# Patient Record
Sex: FEMALE | Race: WHITE | NOT HISPANIC OR LATINO | Employment: OTHER | URBAN - METROPOLITAN AREA
[De-identification: names, ages, dates, MRNs, and addresses within clinical notes are randomized per-mention and may not be internally consistent; named-entity substitution may affect disease eponyms.]

---

## 2017-02-01 ENCOUNTER — CLINICAL SUPPORT (OUTPATIENT)
Dept: FAMILY MEDICINE | Facility: CLINIC | Age: 77
End: 2017-02-01
Payer: MEDICARE

## 2017-02-01 DIAGNOSIS — Z23 IMMUNIZATION DUE: Primary | ICD-10-CM

## 2017-02-01 NOTE — PROGRESS NOTES
Pt in clinic today to receive flu  Immunization. Administered utilizing 2 pt identifiers,  Pt tolerated procedure well. Verified by Rossi Mora LPN, exp 5/3/ 17. CLC

## 2017-02-03 ENCOUNTER — OFFICE VISIT (OUTPATIENT)
Dept: FAMILY MEDICINE | Facility: CLINIC | Age: 77
End: 2017-02-03
Payer: MEDICARE

## 2017-02-03 VITALS
DIASTOLIC BLOOD PRESSURE: 78 MMHG | BODY MASS INDEX: 23.19 KG/M2 | WEIGHT: 122.81 LBS | HEIGHT: 61 IN | OXYGEN SATURATION: 99 % | RESPIRATION RATE: 18 BRPM | SYSTOLIC BLOOD PRESSURE: 116 MMHG | HEART RATE: 83 BPM

## 2017-02-03 DIAGNOSIS — M81.0 OSTEOPOROSIS: ICD-10-CM

## 2017-02-03 DIAGNOSIS — E78.5 HYPERLIPIDEMIA, UNSPECIFIED HYPERLIPIDEMIA TYPE: Primary | ICD-10-CM

## 2017-02-03 DIAGNOSIS — K21.9 GASTROESOPHAGEAL REFLUX DISEASE WITHOUT ESOPHAGITIS: ICD-10-CM

## 2017-02-03 PROCEDURE — 99213 OFFICE O/P EST LOW 20 MIN: CPT | Mod: PBBFAC,PO | Performed by: FAMILY MEDICINE

## 2017-02-03 PROCEDURE — 99214 OFFICE O/P EST MOD 30 MIN: CPT | Mod: S$PBB,,, | Performed by: FAMILY MEDICINE

## 2017-02-03 PROCEDURE — 99999 PR PBB SHADOW E&M-EST. PATIENT-LVL III: CPT | Mod: PBBFAC,,, | Performed by: FAMILY MEDICINE

## 2017-02-03 NOTE — PROGRESS NOTES
Subjective:       Patient ID: Sascha Martinez is a 77 y.o. female.    Chief Complaint: Follow-up (f/u lab results)    HPI Comments: Here for f/u chronic medical issues.  Has not followed up with neurology.  States med side effects so not on anything currently.  Due for labs.    Hyperlipidemia   This is a chronic problem. The current episode started more than 1 year ago. The problem is uncontrolled. Pertinent negatives include no chest pain or shortness of breath.     Review of Systems   Constitutional: Negative for chills, fatigue and fever.   Respiratory: Negative for cough, chest tightness and shortness of breath.    Cardiovascular: Negative for chest pain, palpitations and leg swelling.   Gastrointestinal: Negative for abdominal distention and abdominal pain.   Endocrine: Negative for cold intolerance and heat intolerance.   Skin: Negative for rash and wound.   Psychiatric/Behavioral: Negative for dysphoric mood. The patient is not nervous/anxious.        Objective:      Physical Exam   Constitutional: She is oriented to person, place, and time. She appears well-developed and well-nourished.   HENT:   Head: Normocephalic and atraumatic.   Cardiovascular: Normal rate, regular rhythm and normal heart sounds.    Pulmonary/Chest: Effort normal and breath sounds normal.   Abdominal: Soft. Bowel sounds are normal.   Neurological: She is alert and oriented to person, place, and time.   Psychiatric: She has a normal mood and affect.   Nursing note and vitals reviewed.      Assessment:       1. Hyperlipidemia, unspecified hyperlipidemia type    2. Gastroesophageal reflux disease without esophagitis    3. Osteoporosis        Plan:       Hyperlipidemia, unspecified hyperlipidemia type  -     CBC auto differential; Future; Expected date: 2/3/17  -     Comprehensive metabolic panel; Future; Expected date: 2/3/17  -     Lipid panel; Future; Expected date: 2/3/17  -     TSH; Future; Expected date: 2/3/17    Gastroesophageal  reflux disease without esophagitis  -     CBC auto differential; Future; Expected date: 2/3/17  -     Comprehensive metabolic panel; Future; Expected date: 2/3/17  -     Lipid panel; Future; Expected date: 2/3/17  -     TSH; Future; Expected date: 2/3/17    Osteoporosis  -     CBC auto differential; Future; Expected date: 2/3/17  -     Comprehensive metabolic panel; Future; Expected date: 2/3/17  -     Lipid panel; Future; Expected date: 2/3/17  -     TSH; Future; Expected date: 2/3/17    Update labs.  Start statin if elevated again.  F/u with neurology.  Return in about 6 months (around 8/3/2017), or if symptoms worsen or fail to improve.

## 2017-03-29 ENCOUNTER — OFFICE VISIT (OUTPATIENT)
Dept: FAMILY MEDICINE | Facility: CLINIC | Age: 77
End: 2017-03-29
Payer: MEDICARE

## 2017-03-29 VITALS
HEIGHT: 61 IN | TEMPERATURE: 98 F | OXYGEN SATURATION: 96 % | SYSTOLIC BLOOD PRESSURE: 130 MMHG | HEART RATE: 66 BPM | BODY MASS INDEX: 22.86 KG/M2 | WEIGHT: 121.06 LBS | RESPIRATION RATE: 16 BRPM | DIASTOLIC BLOOD PRESSURE: 80 MMHG

## 2017-03-29 DIAGNOSIS — R05.9 COUGH: Primary | ICD-10-CM

## 2017-03-29 DIAGNOSIS — J34.3 NASAL TURBINATE HYPERTROPHY: ICD-10-CM

## 2017-03-29 DIAGNOSIS — R09.81 NASAL CONGESTION: ICD-10-CM

## 2017-03-29 DIAGNOSIS — J34.89 TENDERNESS OVER MAXILLARY SINUS: ICD-10-CM

## 2017-03-29 DIAGNOSIS — J34.89 RHINORRHEA: ICD-10-CM

## 2017-03-29 PROCEDURE — 96372 THER/PROPH/DIAG INJ SC/IM: CPT | Mod: PBBFAC,PO

## 2017-03-29 PROCEDURE — 99213 OFFICE O/P EST LOW 20 MIN: CPT | Mod: PBBFAC,PO | Performed by: NURSE PRACTITIONER

## 2017-03-29 PROCEDURE — 99999 PR PBB SHADOW E&M-EST. PATIENT-LVL III: CPT | Mod: PBBFAC,,, | Performed by: NURSE PRACTITIONER

## 2017-03-29 PROCEDURE — 99214 OFFICE O/P EST MOD 30 MIN: CPT | Mod: S$PBB,,, | Performed by: NURSE PRACTITIONER

## 2017-03-29 RX ORDER — BETAMETHASONE SODIUM PHOSPHATE AND BETAMETHASONE ACETATE 3; 3 MG/ML; MG/ML
6 INJECTION, SUSPENSION INTRA-ARTICULAR; INTRALESIONAL; INTRAMUSCULAR; SOFT TISSUE
Status: COMPLETED | OUTPATIENT
Start: 2017-03-29 | End: 2017-03-29

## 2017-03-29 RX ORDER — LEVOCETIRIZINE DIHYDROCHLORIDE 5 MG/1
5 TABLET, FILM COATED ORAL NIGHTLY
Qty: 30 TABLET | Refills: 3 | Status: SHIPPED | OUTPATIENT
Start: 2017-03-29 | End: 2018-02-26

## 2017-03-29 RX ADMIN — BETAMETHASONE SODIUM PHOSPHATE AND BETAMETHASONE ACETATE 6 MG: 3; 3 INJECTION, SUSPENSION INTRA-ARTICULAR; INTRALESIONAL; INTRAMUSCULAR at 02:03

## 2017-03-29 NOTE — PATIENT INSTRUCTIONS
Saline nasal spray in shower.  Can use tessalon she has at home for cough    Allergic Rhinitis  Allergic rhinitis is an allergic reaction that affects the nose, and often the eyes. Its often known as nasal allergies. Nasal allergies are often due to things in the environment that are breathed in. Depending what you are sensitive to, nasal allergies may occur only during certain seasons. Or they may occur year round. Common indoor allergens include house dust mites, mold, cockroaches, and pet dander. Outdoor allergens include pollen from trees, grasses, and weeds.   Symptoms include a drippy, stuffy, and itchy nose. They also include sneezing and red and itchy eyes. You may feel tired more often. Severe allergies may also affect your breathing and trigger a condition called asthma.   Tests can be done to see what allergens are affecting you. You may be referred to an allergy specialist for testing and further evaluation.  Home care  The healthcare provider may prescribe medicines to help relieve allergy symptoms.   Ask the provider for advice on how to avoid substances that you are allergic to. Below are a few tips for each type of allergen.  Pet dander:  · Do not have pets with fur and feathers.  · If you cannot avoid having a pet, keep it out of your bedroom and off upholstered furniture.  Pollen:  · When pollen counts are high, keep windows of your car and home closed. If possible, use an air conditioner instead.  · Wear a filter mask when mowing or doing yard work.  House dust mites:  · Wash bedding every week in warm water and detergent and dry on a hot setting.  · Cover the mattress, box spring, and pillows with allergy covers.   · If possible, sleep in a room with no carpet, curtains, or upholstered furniture.  Cockroaches:  · Store food in sealed containers.  · Remove garbage from the home promptly.  · Fix water leaks  Mold:  · Keep humidity low by using a dehumidifier or air conditioner. Keep the  dehumidifier and air conditioner clean and free of mold.  · Clean moldy areas with bleach and water.  In general:  · Vacuum once or twice a week. If possible, use a vacuum with a high-efficiency particulate air (HEPA) filter.  · Do not smoke. Avoid cigarette smoke. Cigarette smoke is an irritant that can make symptoms worse.  Follow-up care  Follow up as advised by the health care provider or our staff. If you were referred to an allergy specialist, make this appointment promptly.  When to seek medical advice  Call your healthcare provider right away if the following occur:  · Coughing or wheezing  · Fever greater than 100.4°F (38°C)  · Continuing symptoms, new symptoms, or worsening symptoms  Call 911 right away if you have:  · Trouble breathing  · Hives (raised red bumps)  · Severe swelling of the face or severe itching of the eyes or mouth  Date Last Reviewed: 4/26/2015  © 1349-7047 [employer]. [address] [place] [address], [place] [address]. All rights reserved. This information is not intended as a substitute for professional medical care. Always follow your healthcare professional's instructions.

## 2017-03-29 NOTE — PROGRESS NOTES
"Subjective:       Patient ID: Sascha Martinez is a 77 y.o. female.    Chief Complaint: Cough  She was last seen in primary care by Dr. Dewitt on 02/03/2017. This is her first time seeing me in the clinic.  Cough   This is a new problem. The current episode started 1 to 4 weeks ago (states about one month ago). The problem has been gradually worsening. The cough is productive of sputum (white phlegm). Associated symptoms include postnasal drip, rhinorrhea and a sore throat. Pertinent negatives include no ear pain, fever or nasal congestion. Nothing aggravates the symptoms. She has tried nothing for the symptoms. Her past medical history is significant for environmental allergies. There is no history of asthma, COPD or pneumonia. environmental allergies "years back"     Vitals:    03/29/17 1044   BP: 130/80   Pulse: 66   Resp: 16   Temp: 97.9 °F (36.6 °C)     Review of Systems   Constitutional: Negative for fever.   HENT: Positive for postnasal drip, rhinorrhea and sore throat. Negative for ear pain.    Respiratory: Positive for cough.    Allergic/Immunologic: Positive for environmental allergies.       Objective:      Physical Exam   Constitutional: She is oriented to person, place, and time. Vital signs are normal. She appears well-developed and well-nourished.   HENT:   Head: Normocephalic and atraumatic.   Right Ear: Hearing, tympanic membrane, external ear and ear canal normal.   Left Ear: Tympanic membrane, external ear and ear canal normal.   Nose: Rhinorrhea present. Right sinus exhibits maxillary sinus tenderness. Left sinus exhibits maxillary sinus tenderness.   Mouth/Throat: Oropharynx is clear and moist.   Eyes: Lids are normal.   Neck: Normal range of motion. Neck supple.   Cardiovascular: Normal rate.  An irregular rhythm present.   Pulmonary/Chest: Effort normal and breath sounds normal.   Abdominal: Soft. Bowel sounds are normal. There is no tenderness.   Lymphadenopathy:        Head (right side): No " "submental, no submandibular, no tonsillar, no preauricular, no posterior auricular and no occipital adenopathy present.        Head (left side): No submental, no submandibular, no tonsillar, no preauricular, no posterior auricular and no occipital adenopathy present.     She has no cervical adenopathy.   Neurological: She is alert and oriented to person, place, and time.   Skin: Skin is warm, dry and intact.   Psychiatric: She has a normal mood and affect. Her speech is normal and behavior is normal. Judgment and thought content normal. Cognition and memory are normal.   Nursing note and vitals reviewed.      Assessment & Plan:       Cough    Rhinorrhea  -     betamethasone acetate-betamethasone sodium phosphate injection 6 mg; Inject 1 mL (6 mg total) into the muscle one time.  -     levocetirizine (XYZAL) 5 MG tablet; Take 1 tablet (5 mg total) by mouth every evening.  Dispense: 30 tablet; Refill: 3    Nasal congestion  -     betamethasone acetate-betamethasone sodium phosphate injection 6 mg; Inject 1 mL (6 mg total) into the muscle one time.    Nasal turbinate hypertrophy  -     betamethasone acetate-betamethasone sodium phosphate injection 6 mg; Inject 1 mL (6 mg total) into the muscle one time.    Tenderness over maxillary sinus    Saline nasal spray in shower.  Can use tessalon she has at home for cough    Discussed with her about side effects of steroid injections if used chronically like skin dimpling at injection site or decreased bone mineral density. Her last injection according to our records was in 2012. I have discussed that her symptoms would clear without steroids but she is very vocal that she is greatly improved with "the shot".      No Follow-up on file.  "

## 2017-06-09 ENCOUNTER — TELEPHONE (OUTPATIENT)
Dept: FAMILY MEDICINE | Facility: CLINIC | Age: 77
End: 2017-06-09

## 2017-06-09 NOTE — TELEPHONE ENCOUNTER
Spoke to pt  and he states pt right upper arm has been hurting for about a month now and has gotten worse. Pt scheduled appt for Monday and would like to know if you can put order in for xray before visit. Please advise.

## 2017-06-09 NOTE — TELEPHONE ENCOUNTER
I would like to see patient first to determine if I need arm, shoulder, or other xray. Please advise will order when she come in for appt. Thanks

## 2017-06-09 NOTE — TELEPHONE ENCOUNTER
----- Message from Salud Weller sent at 6/9/2017  8:48 AM CDT -----  Contact:    sameera   Pain in upper R arm   Slow onset, 1 month  Call back     No injury

## 2017-06-12 ENCOUNTER — OFFICE VISIT (OUTPATIENT)
Dept: FAMILY MEDICINE | Facility: CLINIC | Age: 77
End: 2017-06-12
Payer: MEDICARE

## 2017-06-12 ENCOUNTER — HOSPITAL ENCOUNTER (OUTPATIENT)
Dept: RADIOLOGY | Facility: HOSPITAL | Age: 77
Discharge: HOME | End: 2017-06-12
Attending: NURSE PRACTITIONER
Payer: MEDICARE

## 2017-06-12 VITALS
HEART RATE: 80 BPM | SYSTOLIC BLOOD PRESSURE: 134 MMHG | BODY MASS INDEX: 22.64 KG/M2 | WEIGHT: 119.94 LBS | TEMPERATURE: 98 F | HEIGHT: 61 IN | DIASTOLIC BLOOD PRESSURE: 68 MMHG

## 2017-06-12 DIAGNOSIS — M81.0 SENILE OSTEOPOROSIS: ICD-10-CM

## 2017-06-12 DIAGNOSIS — M79.621 PAIN OF RIGHT UPPER ARM: Primary | ICD-10-CM

## 2017-06-12 DIAGNOSIS — M79.621 PAIN OF RIGHT UPPER ARM: ICD-10-CM

## 2017-06-12 DIAGNOSIS — R29.898 DECREASED GRIP STRENGTH OF RIGHT HAND: ICD-10-CM

## 2017-06-12 DIAGNOSIS — M81.8 OTHER OSTEOPOROSIS WITHOUT CURRENT PATHOLOGICAL FRACTURE: ICD-10-CM

## 2017-06-12 PROCEDURE — 73060 X-RAY EXAM OF HUMERUS: CPT | Mod: 26,RT,, | Performed by: RADIOLOGY

## 2017-06-12 PROCEDURE — 73060 X-RAY EXAM OF HUMERUS: CPT | Mod: TC,PO,RT

## 2017-06-12 PROCEDURE — 1159F MED LIST DOCD IN RCRD: CPT | Mod: ,,, | Performed by: NURSE PRACTITIONER

## 2017-06-12 PROCEDURE — 99213 OFFICE O/P EST LOW 20 MIN: CPT | Mod: S$PBB,,, | Performed by: NURSE PRACTITIONER

## 2017-06-12 PROCEDURE — 1125F AMNT PAIN NOTED PAIN PRSNT: CPT | Mod: ,,, | Performed by: NURSE PRACTITIONER

## 2017-06-12 PROCEDURE — 99999 PR PBB SHADOW E&M-EST. PATIENT-LVL III: CPT | Mod: PBBFAC,,, | Performed by: NURSE PRACTITIONER

## 2017-06-12 RX ORDER — NAPROXEN SODIUM 220 MG
220 TABLET ORAL 2 TIMES DAILY WITH MEALS
COMMUNITY
End: 2019-05-17

## 2017-06-12 NOTE — PROGRESS NOTES
Subjective:       Patient ID: Sascha Martinez is a 77 y.o. female.    Chief Complaint: Pain in shoulder and upper arm (Pain for about 1 month, pain 8/10)  She was last seen in primary care by me on 03/29/2017. She last saw her PCP, Dr. Varela on 02/03/2017  HPI   She is here today complaining of right shoulder pain upper that radiates down to just above antecubital space. Pain has been felt in same area for one month.   Vitals:    06/12/17 1335   BP: 134/68   Pulse: 80   Temp: 98.1 °F (36.7 °C)     Review of Systems    States hurts more to lie down on the arm with decrease in strength of arm/grasp.  Sh first noticed the pain with vacuuming room about one month ago and the symptoms never stopped. Denies any injury to arm. She is right arm/hand dominant.   She has been taking some Aleve and has had minimal relief with using it once daily.  Objective:      Physical Exam   Constitutional: She is oriented to person, place, and time. Vital signs are normal. She appears well-developed and well-nourished.   HENT:   Head: Normocephalic and atraumatic.   Right Ear: Hearing normal.   Left Ear: Hearing normal.   Nose: Nose normal.   Neck: Normal range of motion. Neck supple.   Cardiovascular: Normal rate and regular rhythm.    Pulmonary/Chest: Effort normal and breath sounds normal.   Musculoskeletal: She exhibits tenderness.        Right shoulder: She exhibits decreased range of motion.        Arms:  Neurological: She is alert and oriented to person, place, and time.   Skin: Skin is warm, dry and intact.   Psychiatric: She has a normal mood and affect. Her speech is normal and behavior is normal. Judgment and thought content normal. Cognition and memory are normal.   Nursing note and vitals reviewed.      Assessment & Plan:       Pain of right upper arm  -     X-Ray Humerus 2 View Right; Future; Expected date: 06/12/2017  -     DXA Bone Density Spine And Hip; Future; Expected date: 06/12/2017    Decreased  strength of  right hand  -     X-Ray Humerus 2 View Right; Future; Expected date: 06/12/2017    Other osteoporosis without current pathological fracture  -     X-Ray Humerus 2 View Right; Future; Expected date: 06/12/2017  -     DXA Bone Density Spine And Hip; Future; Expected date: 06/12/2017    Senile osteoporosis  -     DXA Bone Density Spine And Hip; Future; Expected date: 06/12/2017    Take Aleve twice daily with food  Will consider physical therapy after results are available. Patient will give me the name of a physical therapy company that she used prior that she would like to use.      Return in about 1 week (around 6/19/2017), or if symptoms worsen or fail to improve.

## 2017-06-14 ENCOUNTER — HOSPITAL ENCOUNTER (OUTPATIENT)
Dept: RADIOLOGY | Facility: HOSPITAL | Age: 77
Discharge: HOME | End: 2017-06-14
Attending: NURSE PRACTITIONER
Payer: MEDICARE

## 2017-06-14 DIAGNOSIS — M81.8 OTHER OSTEOPOROSIS WITHOUT CURRENT PATHOLOGICAL FRACTURE: ICD-10-CM

## 2017-06-14 DIAGNOSIS — M81.0 SENILE OSTEOPOROSIS: ICD-10-CM

## 2017-06-14 DIAGNOSIS — M79.621 PAIN OF RIGHT UPPER ARM: ICD-10-CM

## 2017-06-14 PROCEDURE — 77080 DXA BONE DENSITY AXIAL: CPT | Mod: 26,,, | Performed by: RADIOLOGY

## 2017-06-14 PROCEDURE — 77080 DXA BONE DENSITY AXIAL: CPT | Mod: TC,PO

## 2017-06-14 NOTE — PROGRESS NOTES
Spoke with patient via telephone regarding results of the xray. I did encourage her to start physical therapy. She states she will call back to give me the name of PT company she prefers.

## 2017-06-29 NOTE — PROGRESS NOTES
"Discussed results of bone density with patient via telephone. Encouraged to continue oscal, walk daily, eliminate sodas. She states she does not want to take fosamax and would like to leave everything "like it is"."

## 2017-10-06 ENCOUNTER — TELEPHONE (OUTPATIENT)
Dept: OPHTHALMOLOGY | Facility: CLINIC | Age: 77
End: 2017-10-06

## 2017-10-06 ENCOUNTER — OFFICE VISIT (OUTPATIENT)
Dept: OPHTHALMOLOGY | Facility: CLINIC | Age: 77
End: 2017-10-06
Payer: MEDICARE

## 2017-10-06 DIAGNOSIS — H16.002 CORNEA ULCER, LEFT: Primary | ICD-10-CM

## 2017-10-06 PROCEDURE — 87070 CULTURE OTHR SPECIMN AEROBIC: CPT

## 2017-10-06 PROCEDURE — 87077 CULTURE AEROBIC IDENTIFY: CPT

## 2017-10-06 PROCEDURE — 99999 PR PBB SHADOW E&M-EST. PATIENT-LVL II: CPT | Mod: PBBFAC,,, | Performed by: OPHTHALMOLOGY

## 2017-10-06 PROCEDURE — 99212 OFFICE O/P EST SF 10 MIN: CPT | Mod: PBBFAC,PO | Performed by: OPHTHALMOLOGY

## 2017-10-06 PROCEDURE — 87186 SC STD MICRODIL/AGAR DIL: CPT

## 2017-10-06 PROCEDURE — 92012 INTRM OPH EXAM EST PATIENT: CPT | Mod: S$PBB,,, | Performed by: OPHTHALMOLOGY

## 2017-10-06 RX ORDER — KETOROLAC TROMETHAMINE 5 MG/ML
1 SOLUTION OPHTHALMIC 4 TIMES DAILY
Qty: 5 ML | Refills: 0 | Status: SHIPPED | OUTPATIENT
Start: 2017-10-06 | End: 2017-12-08 | Stop reason: ALTCHOICE

## 2017-10-06 RX ORDER — ATROPINE SULFATE 10 MG/ML
1 SOLUTION/ DROPS OPHTHALMIC 3 TIMES DAILY
Qty: 5 ML | Refills: 0 | Status: SHIPPED | OUTPATIENT
Start: 2017-10-06 | End: 2017-12-08 | Stop reason: ALTCHOICE

## 2017-10-06 RX ORDER — ATROPINE SULFATE 10 MG/ML
1 SOLUTION/ DROPS OPHTHALMIC 3 TIMES DAILY
Qty: 5 ML | Refills: 0 | Status: SHIPPED | OUTPATIENT
Start: 2017-10-06 | End: 2017-10-06 | Stop reason: SDUPTHER

## 2017-10-06 RX ORDER — OFLOXACIN 3 MG/ML
1 SOLUTION/ DROPS OPHTHALMIC
Qty: 5 ML | Refills: 1 | Status: SHIPPED | OUTPATIENT
Start: 2017-10-06 | End: 2017-10-20

## 2017-10-06 NOTE — TELEPHONE ENCOUNTER
----- Message from Gertrude Davidwilfrido sent at 10/6/2017 10:00 AM CDT -----  Contact:  Adam  Patient is experiencing extreme pain in her left eye.  Not sure if there is a contact stuck or what is causing the issue.  Requesting same day access. Please call [phone].  Thank you!
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
## 2017-10-09 ENCOUNTER — TELEPHONE (OUTPATIENT)
Dept: OPHTHALMOLOGY | Facility: CLINIC | Age: 77
End: 2017-10-09

## 2017-10-09 NOTE — TELEPHONE ENCOUNTER
Spoke to , offered to see her again today. She declined, will keep appt with Dr. De La Cruz tomorrow.

## 2017-10-10 ENCOUNTER — OFFICE VISIT (OUTPATIENT)
Dept: OPTOMETRY | Facility: CLINIC | Age: 77
End: 2017-10-10
Payer: MEDICARE

## 2017-10-10 DIAGNOSIS — H16.002 CORNEAL ULCER, LEFT: Primary | ICD-10-CM

## 2017-10-10 LAB
BACTERIA SPEC AEROBE CULT: NO GROWTH
BACTERIA SPEC AEROBE CULT: NORMAL

## 2017-10-10 PROCEDURE — 99999 PR PBB SHADOW E&M-EST. PATIENT-LVL II: CPT | Mod: PBBFAC,,, | Performed by: OPTOMETRIST

## 2017-10-10 PROCEDURE — 99212 OFFICE O/P EST SF 10 MIN: CPT | Mod: PBBFAC,PO | Performed by: OPTOMETRIST

## 2017-10-10 PROCEDURE — 92012 INTRM OPH EXAM EST PATIENT: CPT | Mod: S$PBB,,, | Performed by: OPTOMETRIST

## 2017-10-10 NOTE — PATIENT INSTRUCTIONS
Stop atropine drop ()  Stop ketorolac drop    Continue ocuflox every 3 hours while awake (5-6 x per day)    CYNDEE [phone]

## 2017-10-10 NOTE — PROGRESS NOTES
HPI     Blurred Vision    Additional comments: only from not wearing cls -- has old specs rx           Eye Problem    Additional comments: f/u k ulcer - OS, +Atropine tid & Ocuflox qhr // pt   states eye is feeling better, decreased pain           Comments   Agree above  Notes slightly blurred   Using gtts as directed  OD:    Atropine tid  Ocuflox qh  acular qid       Last edited by MILO De La Cruz, OD on 10/10/2017  9:45 AM. (History)        ROS     Positive for: Eyes    Negative for: Constitutional, Gastrointestinal, Neurological, Skin,   Genitourinary, Musculoskeletal, HENT, Endocrine, Cardiovascular,   Respiratory, Psychiatric, Allergic/Imm, Heme/Lymph    Last edited by MILO De La Cruz, OD on 10/10/2017  9:36 AM. (History)        Assessment /Plan     For exam results, see Encounter Report.    Corneal ulcer, left      Resolving per history and appearance  Stop atropine and ketorolac  Reduce ocuflox to q3h while awake through Friday 10/13/17  Ok add ATs for comfort  Telephone consult 48 hours, then schedule for annual with refraction---pt will not continue in cl's

## 2017-10-10 NOTE — PROGRESS NOTES
HPI     Eye Problem    Additional comments: f/u k ulcer - OS, +Atropine tid & Ocuflox qhr // pt   states eye is feeling better, decreased pain           Blurred Vision    Additional comments: only from not wearing cls -- has old specs rx           Comments   Agree above  Notes slightly blurred   Using gtts as directed  OD:    Atropine tid  Ocuflox qh  acular qid       Last edited by MILO De La Cruz, OD on 10/10/2017  9:38 AM. (History)        ROS     Positive for: Eyes    Negative for: Constitutional, Gastrointestinal, Neurological, Skin,   Genitourinary, Musculoskeletal, HENT, Endocrine, Cardiovascular,   Respiratory, Psychiatric, Allergic/Imm, Heme/Lymph    Last edited by MILO De La Cruz, OD on 10/10/2017  9:36 AM. (History)        Assessment /Plan     For exam results, see Encounter Report.    There are no diagnoses linked to this encounter.  ***

## 2017-10-18 ENCOUNTER — IMMUNIZATION (OUTPATIENT)
Dept: FAMILY MEDICINE | Facility: CLINIC | Age: 77
End: 2017-10-18
Payer: MEDICARE

## 2017-10-18 PROCEDURE — G0008 ADMIN INFLUENZA VIRUS VAC: HCPCS | Mod: PBBFAC,PO

## 2017-10-24 ENCOUNTER — OFFICE VISIT (OUTPATIENT)
Dept: OPTOMETRY | Facility: CLINIC | Age: 77
End: 2017-10-24
Payer: MEDICARE

## 2017-10-24 DIAGNOSIS — H17.9 CORNEAL SCAR, LEFT EYE: ICD-10-CM

## 2017-10-24 DIAGNOSIS — H43.813 POSTERIOR VITREOUS DETACHMENT, BILATERAL: ICD-10-CM

## 2017-10-24 DIAGNOSIS — Z13.5 GLAUCOMA SCREENING: ICD-10-CM

## 2017-10-24 DIAGNOSIS — H52.203 HYPEROPIA WITH ASTIGMATISM AND PRESBYOPIA, BILATERAL: ICD-10-CM

## 2017-10-24 DIAGNOSIS — H25.13 NUCLEAR SCLEROSIS, BILATERAL: Primary | ICD-10-CM

## 2017-10-24 DIAGNOSIS — H52.03 HYPEROPIA WITH ASTIGMATISM AND PRESBYOPIA, BILATERAL: ICD-10-CM

## 2017-10-24 DIAGNOSIS — H52.4 HYPEROPIA WITH ASTIGMATISM AND PRESBYOPIA, BILATERAL: ICD-10-CM

## 2017-10-24 PROCEDURE — 92014 COMPRE OPH EXAM EST PT 1/>: CPT | Mod: S$PBB,,, | Performed by: OPTOMETRIST

## 2017-10-24 PROCEDURE — 99999 PR PBB SHADOW E&M-EST. PATIENT-LVL II: CPT | Mod: PBBFAC,,, | Performed by: OPTOMETRIST

## 2017-10-24 PROCEDURE — 99212 OFFICE O/P EST SF 10 MIN: CPT | Mod: PBBFAC,PO | Performed by: OPTOMETRIST

## 2017-10-24 NOTE — PATIENT INSTRUCTIONS
Cataract Surgery FAQs  Frequently Asked Questions    My doctor told me I have a cataract     What do I do next?   Relax. Cataracts are a normal part of aging, and cataract surgery is among the safest and most common procedures performed today.  Most patients who have had cataract surgery report significant improvement in their quality of life because of their improved vision, with a speedy recovery and minimal discomfort or inconvenience.    Your optometrist will recommend a cataract surgeon who will examine your eyes, evaluate the need for surgery, and discuss the details with you and your family.     What exactly is a cataract?   A cataract is simply a darkening or clouding of the eyes internal lens, which blurs your vision.  It is not a film which grows over the eye, but rather a degenerative process which causes the eyes normally clear lens to become cloudy or hazy.     Because this degenerative process occurs slowly over many years, we generally wait until the cataract begins to significantly impact your vision, before recommend surgery.                     How is cataract surgery performed?  Cataract surgery involves removal of the dark or cloudy lens from the eye, and implantation of a new, clear lens implant or IOL.  Cataract surgery is a lens replacement surgery.          Modern cataract surgery is performed as a same-day surgery and typically takes about 15 minutes to complete.  It is performed while you are awake, but you will be medicated so that you are relaxed and comfortable. Of course, the eye is anesthetized so that you dont feel any discomfort, and many people only vaguely remember the actual procedure, recalling only lights and the sensation of moisture on the eye.     Although is takes about a week to fully heal, most people are able to see better and resume their normal activities the very next day!  You will need to use eye drops for about one month after your surgery.     Will I  need glasses after cataract surgery?   The purpose of cataract surgery is to improve the overall quality of your vision, but it does not necessarily eliminate the need for glasses. Although some people dont need to wear glasses after standard cataract surgery, most people will still need to wear glasses for certain tasks.  If you are interested in minimizing or even eliminating the need for glasses, you should ask your surgeon about Refractive Cataract Surgery.    What is Refractive Cataract Surgery?   Refractive Cataract Surgery refers to the use of additional techniques performed either at the time of your cataract surgery or soon afterwards, designed to minimize and often eliminate your need for glasses.  Technologies such as LASIK, Astigmatism Correcting Incisions, Multifocal, Accommodating, or Toric lens implants can all be used, depending on the particular needs of each patient. Only your surgeon can determine if you are a candidate and which techniques are appropriate for your goals and your eye.                         LASIK                Multifocal IOL         Will my insurance cover these additional procedures?   Although your insurance will fully cover your cataract surgery, any other additional procedures -designed solely to eliminate the need for glasses- are considered elective (not medically necessary), and therefore not covered by your insurance.  Rest assured that your vision will be better after cataract surgery, in either case.  You simply need to decide whether minimizing your dependence on glasses is worth the additional investment in your eyes.  (Costs typically range between $1,000 to $2,000 per eye, depending on your needs).    View a 1hr video discussing cataract surgery with live patient questions and answers on the [employer] Web Site (Keywords: [facility] Cataract):    [email or web address]  FLASHES / FLOATERS / POSTERIOR VITREOUS DETACHMENT    Call  the clinic if you have any further changes in symptoms.  Including:  Increased numbers of floaters or flashing lights, dimness or darkness that moves through or stays constant in your vision, or any pain in the eye (s).

## 2017-10-24 NOTE — PROGRESS NOTES
HPI     Routine eye exam--dle--10/10/17-k ulcer    Pt states k ulcer is feeling better since last visit-no longer using any   gtts. Wanting cataracts checked-complains of blurred vision OS    Last edited by Caron De La Rosa on 10/24/2017  2:04 PM. (History)        ROS     Positive for: Eyes    Negative for: Constitutional, Gastrointestinal, Neurological, Skin,   Genitourinary, Musculoskeletal, HENT, Endocrine, Cardiovascular,   Respiratory, Psychiatric, Allergic/Imm, Heme/Lymph    Last edited by MILO De La Cruz, OD on 10/24/2017  2:28 PM. (History)        Assessment /Plan     For exam results, see Encounter Report.    Nuclear sclerosis, bilateral    Posterior vitreous detachment, bilateral    Glaucoma screening    Corneal scar, left eye    Hyperopia with astigmatism and presbyopia, bilateral      1. Vis sig / borderline ready for consult  Gave info, pt to decide on date vs 6 months recheck w/ me  2. RD precautions given  3. Not suspect  4. K ulcer resolved, 1.5mm scar, not in vis axis  5. Updated specs rx gave copy---hold for now    Discussed and educated patient on current findings /plan.  RTC 1 year, prn if any changes / issues

## 2017-11-29 ENCOUNTER — TELEPHONE (OUTPATIENT)
Dept: OPHTHALMOLOGY | Facility: CLINIC | Age: 77
End: 2017-11-29

## 2017-11-29 NOTE — TELEPHONE ENCOUNTER
Pt is scheduled with Dr Tomas on 12/8/17. Spoke to pt and advised Dr Tomas didn't have a sooner appt for a cataract eval.  Pt understood.

## 2017-11-29 NOTE — TELEPHONE ENCOUNTER
----- Message from Virginia Castillo sent at 11/29/2017 12:57 PM CST -----  Patient has an appointment on 12/8/17. She is calling to see if there is anyway that she can get an earlier date. Please call to advise at [phone].

## 2017-12-08 ENCOUNTER — INITIAL CONSULT (OUTPATIENT)
Dept: OPHTHALMOLOGY | Facility: CLINIC | Age: 77
End: 2017-12-08
Payer: MEDICARE

## 2017-12-08 DIAGNOSIS — H25.11 NUCLEAR SCLEROTIC CATARACT OF RIGHT EYE: Primary | ICD-10-CM

## 2017-12-08 DIAGNOSIS — H25.12 NUCLEAR SCLEROTIC CATARACT OF LEFT EYE: ICD-10-CM

## 2017-12-08 DIAGNOSIS — H52.03 HYPERMETROPIA OF BOTH EYES: ICD-10-CM

## 2017-12-08 PROCEDURE — 92136 OPHTHALMIC BIOMETRY: CPT | Mod: PBBFAC,RT | Performed by: OPHTHALMOLOGY

## 2017-12-08 PROCEDURE — 92025 CPTRIZED CORNEAL TOPOGRAPHY: CPT | Mod: PBBFAC | Performed by: OPHTHALMOLOGY

## 2017-12-08 PROCEDURE — 99999 PR PBB SHADOW E&M-EST. PATIENT-LVL II: CPT | Mod: PBBFAC,,, | Performed by: OPHTHALMOLOGY

## 2017-12-08 PROCEDURE — 99212 OFFICE O/P EST SF 10 MIN: CPT | Mod: PBBFAC | Performed by: OPHTHALMOLOGY

## 2017-12-08 PROCEDURE — 92014 COMPRE OPH EXAM EST PT 1/>: CPT | Mod: S$PBB,,, | Performed by: OPHTHALMOLOGY

## 2017-12-08 RX ORDER — OFLOXACIN 3 MG/ML
1 SOLUTION/ DROPS OPHTHALMIC 3 TIMES DAILY
Qty: 5 ML | Refills: 0 | Status: SHIPPED | OUTPATIENT
Start: 2017-12-08 | End: 2018-02-28 | Stop reason: SDUPTHER

## 2017-12-08 RX ORDER — PREDNISOLONE ACETATE 10 MG/ML
1 SUSPENSION/ DROPS OPHTHALMIC 3 TIMES DAILY
Qty: 5 ML | Refills: 1 | Status: SHIPPED | OUTPATIENT
Start: 2017-12-08 | End: 2018-01-07

## 2017-12-08 RX ORDER — KETOROLAC TROMETHAMINE 5 MG/ML
1 SOLUTION OPHTHALMIC 3 TIMES DAILY
Qty: 5 ML | Refills: 1 | Status: SHIPPED | OUTPATIENT
Start: 2017-12-08 | End: 2018-08-31

## 2017-12-08 NOTE — Clinical Note
pls call next wk and schedule cat sx OD first on NS. Thanks. pls mail instructions etc. She has drops.

## 2017-12-08 NOTE — LETTER
December 8, 2017      MILO De La Cruz, OD  [address] [facility] [address] [place] [address]           [facility] Ophthalmology  [address]  [place] [address]  Phone: [phone]  Fax: [phone]          Patient: Sascha Martinez   MR Number: [id number]   YOB: 1940   Date of Visit: 12/8/2017       Dear Dr. MILO De La Cruz:    Thank you for referring Sascha Martinez to me for evaluation. Attached you will find relevant portions of my assessment and plan of care.    If you have questions, please do not hesitate to call me. I look forward to following Sascha Martinez along with you.    Sincerely,    Miesha Tomas MD    Enclosure  CC:  No Recipients    If you would like to receive this communication electronically, please contact [email or web address] or [phone] to request more information on [employer] Link access.    For providers and/or their staff who would like to refer a patient to [facility], please contact us through our one-stop-shop provider referral line, [facility] [place], operated by [facility], at [phone].    If you feel you have received this communication in error or would no longer like to receive these types of communications, please e-mail [email or web address]

## 2017-12-08 NOTE — PROGRESS NOTES
HPI     MILO De La Cruz, OD    Gtts: None    Patient states OU are better, just some decrease in va. Denies any pain,   no f/f    Last edited by Richy Faulkner MA on 12/8/2017 10:46 AM. (History)            Assessment /Plan     For exam results, see Encounter Report.    Nuclear sclerotic cataract of right eye  -     IOL Master - OD - Right Eye  -     Computerized corneal topography    Other orders  -     prednisoLONE acetate (PRED FORTE) 1 % DrpS; Place 1 drop into the right eye 3 (three) times daily.  Dispense: 5 mL; Refill: 1  -     ofloxacin (OCUFLOX) 0.3 % ophthalmic solution; Place 1 drop into the right eye 3 (three) times daily.  Dispense: 5 mL; Refill: 0  -     ketorolac 0.5% (ACULAR) 0.5 % Drop; Place 1 drop into the right eye 3 (three) times daily.  Dispense: 5 mL; Refill: 1    Visually significant nuclear sclerotic cataract   - Interfering with activities of daily living.  Pt desires cataract surgery for Va rehabilitation.   - R/B/A discussed and pt agrees to proceed with surgery.   - IOL options discussed according to patient's goals and concomitant ocular pathology; and pt content with monofocal lens.    - Target: plano.    od dominant    zlboo 21.5 od    (zlboo 21.5 OS if content with OD -- or may need symfony toric)

## 2017-12-21 ENCOUNTER — TELEPHONE (OUTPATIENT)
Dept: OPHTHALMOLOGY | Facility: CLINIC | Age: 77
End: 2017-12-21

## 2017-12-21 DIAGNOSIS — H25.12 NUCLEAR SCLEROTIC CATARACT OF LEFT EYE: Primary | ICD-10-CM

## 2018-01-05 ENCOUNTER — OFFICE VISIT (OUTPATIENT)
Dept: FAMILY MEDICINE | Facility: CLINIC | Age: 78
End: 2018-01-05
Payer: MEDICARE

## 2018-01-05 VITALS
DIASTOLIC BLOOD PRESSURE: 74 MMHG | HEIGHT: 61 IN | RESPIRATION RATE: 16 BRPM | TEMPERATURE: 98 F | HEART RATE: 68 BPM | WEIGHT: 116.38 LBS | SYSTOLIC BLOOD PRESSURE: 126 MMHG | OXYGEN SATURATION: 96 % | BODY MASS INDEX: 21.97 KG/M2

## 2018-01-05 DIAGNOSIS — H25.11 NUCLEAR SCLEROTIC CATARACT, RIGHT: ICD-10-CM

## 2018-01-05 DIAGNOSIS — G31.84 MILD COGNITIVE IMPAIRMENT: ICD-10-CM

## 2018-01-05 DIAGNOSIS — Z01.818 PRE-OPERATIVE CLEARANCE: Primary | ICD-10-CM

## 2018-01-05 PROCEDURE — 99999 PR PBB SHADOW E&M-EST. PATIENT-LVL IV: CPT | Mod: PBBFAC,,, | Performed by: NURSE PRACTITIONER

## 2018-01-05 PROCEDURE — 99214 OFFICE O/P EST MOD 30 MIN: CPT | Mod: PBBFAC,PO | Performed by: NURSE PRACTITIONER

## 2018-01-05 PROCEDURE — 99214 OFFICE O/P EST MOD 30 MIN: CPT | Mod: S$PBB,,, | Performed by: NURSE PRACTITIONER

## 2018-01-05 NOTE — PROGRESS NOTES
Subjective:       Patient ID: Sascha Martinez is a 77 y.o. female.    Chief Complaint: Pre-op Exam  She is here today for clearance for cataract surgery. She states having left eye surgery on 01/30/2018.  HPI   She is here today for cataract clearance  Vitals:    01/05/18 0838   BP: 126/74   Pulse: 68   Resp: 16   Temp: 97.7 °F (36.5 °C)     Past Medical History:   Diagnosis Date    Arthritis     right thumb,left elbow    Carpal tunnel syndrome, bilateral     Cataract     OU    DVT (deep venous thrombosis)     after hysterectomy    Endometrial thickening on ultra sound 2013    GERD (gastroesophageal reflux disease)     hiatal hernia    Hyperlipidemia     Migraine headache     Osteoporosis     PVD (posterior vitreous detachment)     OU     Lab Results   Component Value Date    WBC 12.59 11/18/2016    HGB 12.8 11/18/2016    HCT 37.8 11/18/2016    MCV 90 11/18/2016     (H) 11/18/2016     CMP  Sodium   Date Value Ref Range Status   11/30/2016 140 136 - 145 mmol/L Final     Potassium   Date Value Ref Range Status   11/30/2016 4.2 3.5 - 5.1 mmol/L Final     Chloride   Date Value Ref Range Status   11/30/2016 107 95 - 110 mmol/L Final     CO2   Date Value Ref Range Status   11/30/2016 26 23 - 29 mmol/L Final     Glucose   Date Value Ref Range Status   11/30/2016 89 70 - 110 mg/dL Final     BUN, Bld   Date Value Ref Range Status   11/30/2016 13 8 - 23 mg/dL Final     Creatinine   Date Value Ref Range Status   11/30/2016 0.8 0.5 - 1.4 mg/dL Final     Calcium   Date Value Ref Range Status   11/30/2016 9.2 8.7 - 10.5 mg/dL Final     Total Protein   Date Value Ref Range Status   11/30/2016 7.4 6.0 - 8.4 g/dL Final     Albumin   Date Value Ref Range Status   11/30/2016 3.8 3.5 - 5.2 g/dL Final     Total Bilirubin   Date Value Ref Range Status   11/30/2016 0.5 0.1 - 1.0 mg/dL Final     Comment:     For infants and newborns, interpretation of results should be based  on gestational age, weight and in agreement  with clinical  observations.  Premature Infant recommended reference ranges:  Up to 24 hours.............<8.0 mg/dL  Up to 48 hours............<12.0 mg/dL  3-5 days..................<15.0 mg/dL  6-29 days.................<15.0 mg/dL       Alkaline Phosphatase   Date Value Ref Range Status   2016 57 55 - 135 U/L Final     AST   Date Value Ref Range Status   2016 17 10 - 40 U/L Final     ALT   Date Value Ref Range Status   2016 13 10 - 44 U/L Final     Anion Gap   Date Value Ref Range Status   2016 7 (L) 8 - 16 mmol/L Final     eGFR if    Date Value Ref Range Status   2016 >60.0 >60 mL/min/1.73 m^2 Final     eGFR if non    Date Value Ref Range Status   2016 >60.0 >60 mL/min/1.73 m^2 Final     Comment:     Calculation used to obtain the estimated glomerular filtration  rate (eGFR) is the CKD-EPI equation. Since race is unknown   in our information system, the eGFR values for   -American and Non--American patients are given   for each creatinine result.       No results found for: LABA1C, HGBA1C  EK  Cardiac Clearance Needed: NO   Anticoagulants: NO     Review of Systems    She has no voiced complaints today  Objective:      Physical Exam   Constitutional: She is oriented to person, place, and time. Vital signs are normal. She appears well-developed and well-nourished. She is active and cooperative.   HENT:   Head: Normocephalic and atraumatic.   Right Ear: Hearing, tympanic membrane, external ear and ear canal normal.   Left Ear: Hearing, tympanic membrane, external ear and ear canal normal.   Nose: Nose normal.   Mouth/Throat: Uvula is midline, oropharynx is clear and moist and mucous membranes are normal.   Eyes: Conjunctivae and lids are normal. Right eye exhibits no chemosis, no discharge, no exudate and no hordeolum. No foreign body present in the right eye. Left eye exhibits no chemosis, no discharge, no exudate and no  hordeolum. No foreign body present in the left eye. No scleral icterus.   Neck: Trachea normal, normal range of motion, full passive range of motion without pain and phonation normal. Neck supple.   Cardiovascular: Normal rate, regular rhythm and normal heart sounds.    Pulmonary/Chest: Effort normal and breath sounds normal.   Abdominal: Soft. Bowel sounds are normal. There is no tenderness.   Musculoskeletal: Normal range of motion.   Lymphadenopathy:        Head (right side): No submental, no submandibular, no tonsillar, no preauricular, no posterior auricular and no occipital adenopathy present.        Head (left side): No submental, no submandibular, no tonsillar, no preauricular, no posterior auricular and no occipital adenopathy present.     She has no cervical adenopathy.   Neurological: She is alert and oriented to person, place, and time.   Skin: Skin is warm, dry and intact.   Psychiatric: She has a normal mood and affect. Her speech is normal and behavior is normal. Judgment and thought content normal. Cognition and memory are normal.   Nursing note and vitals reviewed.      Assessment & Plan:       Pre-operative clearance  -     CBC auto differential; Future; Expected date: 01/05/2018  -     Comprehensive metabolic panel; Future; Expected date: 01/05/2018  -     TSH; Future; Expected date: 01/05/2018    Nuclear sclerotic cataract, right  -     CBC auto differential; Future; Expected date: 01/05/2018  -     Comprehensive metabolic panel; Future; Expected date: 01/05/2018  -     TSH; Future; Expected date: 01/05/2018    Mild cognitive impairment   -     TSH; Future; Expected date: 01/05/2018    She is cleared for cataract surgery    She has not had labs in greater than one year and I have ordered them today so she can have at later date  Return if symptoms worsen or fail to improve.

## 2018-01-08 ENCOUNTER — LAB VISIT (OUTPATIENT)
Dept: LAB | Facility: HOSPITAL | Age: 78
End: 2018-01-08
Attending: NURSE PRACTITIONER
Payer: MEDICARE

## 2018-01-08 DIAGNOSIS — Z01.818 PRE-OPERATIVE CLEARANCE: ICD-10-CM

## 2018-01-08 DIAGNOSIS — H25.11 NUCLEAR SCLEROTIC CATARACT, RIGHT: ICD-10-CM

## 2018-01-08 DIAGNOSIS — G31.84 MILD COGNITIVE IMPAIRMENT: ICD-10-CM

## 2018-01-08 LAB
ALBUMIN SERPL BCP-MCNC: 3.7 G/DL
ALP SERPL-CCNC: 59 U/L
ALT SERPL W/O P-5'-P-CCNC: 11 U/L
ANION GAP SERPL CALC-SCNC: 6 MMOL/L
AST SERPL-CCNC: 15 U/L
BASOPHILS # BLD AUTO: 0.04 K/UL
BASOPHILS NFR BLD: 0.7 %
BILIRUB SERPL-MCNC: 0.6 MG/DL
BUN SERPL-MCNC: 9 MG/DL
CALCIUM SERPL-MCNC: 9.2 MG/DL
CHLORIDE SERPL-SCNC: 107 MMOL/L
CO2 SERPL-SCNC: 28 MMOL/L
CREAT SERPL-MCNC: 0.7 MG/DL
DIFFERENTIAL METHOD: ABNORMAL
EOSINOPHIL # BLD AUTO: 0.1 K/UL
EOSINOPHIL NFR BLD: 2.4 %
ERYTHROCYTE [DISTWIDTH] IN BLOOD BY AUTOMATED COUNT: 12.5 %
EST. GFR  (AFRICAN AMERICAN): >60 ML/MIN/1.73 M^2
EST. GFR  (NON AFRICAN AMERICAN): >60 ML/MIN/1.73 M^2
GLUCOSE SERPL-MCNC: 78 MG/DL
HCT VFR BLD AUTO: 39.1 %
HGB BLD-MCNC: 12.9 G/DL
IMM GRANULOCYTES # BLD AUTO: 0.02 K/UL
IMM GRANULOCYTES NFR BLD AUTO: 0.3 %
LYMPHOCYTES # BLD AUTO: 2 K/UL
LYMPHOCYTES NFR BLD: 34.3 %
MCH RBC QN AUTO: 30.2 PG
MCHC RBC AUTO-ENTMCNC: 33 G/DL
MCV RBC AUTO: 92 FL
MONOCYTES # BLD AUTO: 0.6 K/UL
MONOCYTES NFR BLD: 10.4 %
NEUTROPHILS # BLD AUTO: 3.1 K/UL
NEUTROPHILS NFR BLD: 51.9 %
NRBC BLD-RTO: 0 /100 WBC
PLATELET # BLD AUTO: 421 K/UL
PMV BLD AUTO: 9.2 FL
POTASSIUM SERPL-SCNC: 3.9 MMOL/L
PROT SERPL-MCNC: 7 G/DL
RBC # BLD AUTO: 4.27 M/UL
SODIUM SERPL-SCNC: 141 MMOL/L
TSH SERPL DL<=0.005 MIU/L-ACNC: 3.62 UIU/ML
WBC # BLD AUTO: 5.94 K/UL

## 2018-01-08 PROCEDURE — 80053 COMPREHEN METABOLIC PANEL: CPT

## 2018-01-08 PROCEDURE — 36415 COLL VENOUS BLD VENIPUNCTURE: CPT | Mod: PO

## 2018-01-08 PROCEDURE — 85025 COMPLETE CBC W/AUTO DIFF WBC: CPT

## 2018-01-08 PROCEDURE — 84443 ASSAY THYROID STIM HORMONE: CPT

## 2018-01-10 ENCOUNTER — TELEPHONE (OUTPATIENT)
Dept: OPHTHALMOLOGY | Facility: CLINIC | Age: 78
End: 2018-01-10

## 2018-01-15 ENCOUNTER — TELEPHONE (OUTPATIENT)
Dept: OPHTHALMOLOGY | Facility: CLINIC | Age: 78
End: 2018-01-15

## 2018-01-15 NOTE — TELEPHONE ENCOUNTER
Called and spoke to pt's  concerning cat sx. Mr Juan said that Dr Tomas wants to do the right eye first so let him know I will correct that with surgery. She has her instruction sheet for her surgery on 1/30/18 and set her surgery for her left eye on 3/13. Told him the surgery center would call them the day before surgery to give her arrival time and go over meds.

## 2018-01-15 NOTE — TELEPHONE ENCOUNTER
----- Message from Miesha Tomas MD sent at 1/15/2018  1:11 PM CST -----  Contact:  - Adam [phone]  Okay done - spoke to patient's .    cheryle - does she have a date for 2nd eye??    ----- Message -----  From: Richy Faulkner MA  Sent: 1/10/2018   2:56 PM  To: Miesha Tomas MD    Patient  would like OS done first, which patient feels is her worst eye. Please can you make this change? Per     thanks  ----- Message -----  From: Nova Kruger  Sent: 1/10/2018   8:30 AM  To: Genoveva MENENDEZ Staff    Patient's  asking to speak with the nurse regarding patient's cataract surgery. the  states the patient want to have surgery of the left eye first. Patient is schedule for cataract surgery for right eye at the end of the month.Thanks!

## 2018-01-29 ENCOUNTER — ANESTHESIA EVENT (OUTPATIENT)
Dept: SURGERY | Facility: HOSPITAL | Age: 78
End: 2018-01-29
Payer: MEDICARE

## 2018-01-30 ENCOUNTER — HOSPITAL ENCOUNTER (OUTPATIENT)
Facility: HOSPITAL | Age: 78
Discharge: HOME | End: 2018-01-30
Attending: OPHTHALMOLOGY | Admitting: OPHTHALMOLOGY
Payer: MEDICARE

## 2018-01-30 ENCOUNTER — SURGERY (OUTPATIENT)
Age: 78
End: 2018-01-30

## 2018-01-30 ENCOUNTER — ANESTHESIA (OUTPATIENT)
Dept: SURGERY | Facility: HOSPITAL | Age: 78
End: 2018-01-30
Payer: MEDICARE

## 2018-01-30 ENCOUNTER — OFFICE VISIT (OUTPATIENT)
Dept: OPHTHALMOLOGY | Facility: CLINIC | Age: 78
End: 2018-01-30
Payer: MEDICARE

## 2018-01-30 VITALS
OXYGEN SATURATION: 100 % | BODY MASS INDEX: 20.98 KG/M2 | WEIGHT: 114 LBS | HEART RATE: 71 BPM | RESPIRATION RATE: 16 BRPM | HEIGHT: 62 IN | TEMPERATURE: 97 F | SYSTOLIC BLOOD PRESSURE: 151 MMHG | DIASTOLIC BLOOD PRESSURE: 72 MMHG

## 2018-01-30 DIAGNOSIS — H25.10 SENILE NUCLEAR SCLEROSIS: ICD-10-CM

## 2018-01-30 DIAGNOSIS — Z98.41 STATUS POST CATARACT EXTRACTION AND INSERTION OF INTRAOCULAR LENS, RIGHT: Primary | ICD-10-CM

## 2018-01-30 DIAGNOSIS — H25.12 NUCLEAR SCLEROTIC CATARACT OF LEFT EYE: Primary | ICD-10-CM

## 2018-01-30 DIAGNOSIS — Z96.1 STATUS POST CATARACT EXTRACTION AND INSERTION OF INTRAOCULAR LENS, RIGHT: Primary | ICD-10-CM

## 2018-01-30 DIAGNOSIS — H25.11 NUCLEAR SENILE CATARACT OF RIGHT EYE: ICD-10-CM

## 2018-01-30 DIAGNOSIS — H25.12 NUCLEAR SCLEROTIC CATARACT OF LEFT EYE: ICD-10-CM

## 2018-01-30 PROCEDURE — D9220A PRA ANESTHESIA: Mod: ANES,,, | Performed by: ANESTHESIOLOGY

## 2018-01-30 PROCEDURE — 99024 POSTOP FOLLOW-UP VISIT: CPT | Mod: POP,,, | Performed by: OPHTHALMOLOGY

## 2018-01-30 PROCEDURE — D9220A PRA ANESTHESIA: Mod: CRNA,,, | Performed by: NURSE ANESTHETIST, CERTIFIED REGISTERED

## 2018-01-30 PROCEDURE — 25000003 PHARM REV CODE 250: Mod: PO | Performed by: OPHTHALMOLOGY

## 2018-01-30 PROCEDURE — 99499 UNLISTED E&M SERVICE: CPT | Mod: ,,, | Performed by: OPHTHALMOLOGY

## 2018-01-30 PROCEDURE — 63600175 PHARM REV CODE 636 W HCPCS: Mod: PO | Performed by: NURSE ANESTHETIST, CERTIFIED REGISTERED

## 2018-01-30 PROCEDURE — V2788 PRESBYOPIA-CORRECT FUNCTION: HCPCS | Mod: PO | Performed by: OPHTHALMOLOGY

## 2018-01-30 PROCEDURE — 37000009 HC ANESTHESIA EA ADD 15 MINS: Mod: PO | Performed by: OPHTHALMOLOGY

## 2018-01-30 PROCEDURE — C9447 INJ, PHENYLEPHRINE KETOROLAC: HCPCS | Mod: PO | Performed by: OPHTHALMOLOGY

## 2018-01-30 PROCEDURE — 36000707: Mod: PO | Performed by: OPHTHALMOLOGY

## 2018-01-30 PROCEDURE — 63600175 PHARM REV CODE 636 W HCPCS: Mod: PO | Performed by: OPHTHALMOLOGY

## 2018-01-30 PROCEDURE — 25000003 PHARM REV CODE 250: Mod: PO | Performed by: ANESTHESIOLOGY

## 2018-01-30 PROCEDURE — 66984 XCAPSL CTRC RMVL W/O ECP: CPT | Mod: RT,,, | Performed by: OPHTHALMOLOGY

## 2018-01-30 PROCEDURE — 37000008 HC ANESTHESIA 1ST 15 MINUTES: Mod: PO | Performed by: OPHTHALMOLOGY

## 2018-01-30 PROCEDURE — 71000033 HC RECOVERY, INTIAL HOUR: Mod: PO | Performed by: OPHTHALMOLOGY

## 2018-01-30 PROCEDURE — 36000706: Mod: PO | Performed by: OPHTHALMOLOGY

## 2018-01-30 DEVICE — IMPLANTABLE DEVICE: Type: IMPLANTABLE DEVICE | Site: EYE | Status: FUNCTIONAL

## 2018-01-30 RX ORDER — MOXIFLOXACIN 5 MG/ML
SOLUTION/ DROPS OPHTHALMIC
Status: DISCONTINUED | OUTPATIENT
Start: 2018-01-30 | End: 2018-01-30 | Stop reason: HOSPADM

## 2018-01-30 RX ORDER — FENTANYL CITRATE 50 UG/ML
INJECTION, SOLUTION INTRAMUSCULAR; INTRAVENOUS
Status: DISCONTINUED | OUTPATIENT
Start: 2018-01-30 | End: 2018-01-30

## 2018-01-30 RX ORDER — MOXIFLOXACIN 5 MG/ML
1 SOLUTION/ DROPS OPHTHALMIC
Status: DISCONTINUED | OUTPATIENT
Start: 2018-01-30 | End: 2018-01-30

## 2018-01-30 RX ORDER — ONDANSETRON 2 MG/ML
INJECTION INTRAMUSCULAR; INTRAVENOUS
Status: DISCONTINUED | OUTPATIENT
Start: 2018-01-30 | End: 2018-01-30

## 2018-01-30 RX ORDER — SODIUM CHLORIDE 0.9 % (FLUSH) 0.9 %
3 SYRINGE (ML) INJECTION
Status: DISCONTINUED | OUTPATIENT
Start: 2018-01-30 | End: 2018-01-30 | Stop reason: HOSPADM

## 2018-01-30 RX ORDER — LIDOCAINE HYDROCHLORIDE 10 MG/ML
0.5 INJECTION, SOLUTION EPIDURAL; INFILTRATION; INTRACAUDAL; PERINEURAL ONCE AS NEEDED
Status: COMPLETED | OUTPATIENT
Start: 2018-01-30 | End: 2018-01-30

## 2018-01-30 RX ORDER — SODIUM CHLORIDE 9 MG/ML
INJECTION, SOLUTION INTRAVENOUS CONTINUOUS
Status: DISCONTINUED | OUTPATIENT
Start: 2018-01-30 | End: 2018-01-30

## 2018-01-30 RX ORDER — SODIUM CHLORIDE, SODIUM LACTATE, POTASSIUM CHLORIDE, CALCIUM CHLORIDE 600; 310; 30; 20 MG/100ML; MG/100ML; MG/100ML; MG/100ML
INJECTION, SOLUTION INTRAVENOUS CONTINUOUS
Status: DISCONTINUED | OUTPATIENT
Start: 2018-01-30 | End: 2018-01-30 | Stop reason: HOSPADM

## 2018-01-30 RX ORDER — PREDNISOLONE ACETATE 10 MG/ML
SUSPENSION/ DROPS OPHTHALMIC
Status: DISCONTINUED | OUTPATIENT
Start: 2018-01-30 | End: 2018-01-30 | Stop reason: HOSPADM

## 2018-01-30 RX ORDER — MIDAZOLAM HYDROCHLORIDE 1 MG/ML
INJECTION, SOLUTION INTRAMUSCULAR; INTRAVENOUS
Status: DISCONTINUED | OUTPATIENT
Start: 2018-01-30 | End: 2018-01-30

## 2018-01-30 RX ORDER — KETOROLAC TROMETHAMINE 5 MG/ML
1 SOLUTION OPHTHALMIC ONCE
Status: COMPLETED | OUTPATIENT
Start: 2018-01-30 | End: 2018-01-30

## 2018-01-30 RX ORDER — LIDOCAINE HYDROCHLORIDE 10 MG/ML
INJECTION, SOLUTION EPIDURAL; INFILTRATION; INTRACAUDAL; PERINEURAL
Status: DISCONTINUED | OUTPATIENT
Start: 2018-01-30 | End: 2018-01-30 | Stop reason: HOSPADM

## 2018-01-30 RX ORDER — TROPICAMIDE 10 MG/ML
1 SOLUTION/ DROPS OPHTHALMIC
Status: DISCONTINUED | OUTPATIENT
Start: 2018-01-30 | End: 2018-01-30 | Stop reason: HOSPADM

## 2018-01-30 RX ORDER — PHENYLEPHRINE HYDROCHLORIDE 25 MG/ML
1 SOLUTION/ DROPS OPHTHALMIC
Status: DISCONTINUED | OUTPATIENT
Start: 2018-01-30 | End: 2018-01-30 | Stop reason: HOSPADM

## 2018-01-30 RX ORDER — OFLOXACIN 3 MG/ML
1 SOLUTION/ DROPS OPHTHALMIC
Status: COMPLETED | OUTPATIENT
Start: 2018-01-30 | End: 2018-01-30

## 2018-01-30 RX ORDER — PROPARACAINE HYDROCHLORIDE 5 MG/ML
1 SOLUTION/ DROPS OPHTHALMIC
Status: DISCONTINUED | OUTPATIENT
Start: 2018-01-30 | End: 2018-01-30 | Stop reason: HOSPADM

## 2018-01-30 RX ORDER — LIDOCAINE HYDROCHLORIDE 40 MG/ML
1 INJECTION, SOLUTION RETROBULBAR
Status: COMPLETED | OUTPATIENT
Start: 2018-01-30 | End: 2018-01-30

## 2018-01-30 RX ORDER — ACETAMINOPHEN 325 MG/1
650 TABLET ORAL EVERY 4 HOURS PRN
Status: DISCONTINUED | OUTPATIENT
Start: 2018-01-30 | End: 2018-01-30 | Stop reason: HOSPADM

## 2018-01-30 RX ADMIN — OFLOXACIN 1 DROP: 3 SOLUTION/ DROPS OPHTHALMIC at 09:01

## 2018-01-30 RX ADMIN — TROPICAMIDE 1 DROP: 10 SOLUTION/ DROPS OPHTHALMIC at 09:01

## 2018-01-30 RX ADMIN — MOXIFLOXACIN HYDROCHLORIDE 1 DROP: 5 SOLUTION/ DROPS OPHTHALMIC at 10:01

## 2018-01-30 RX ADMIN — FENTANYL CITRATE 50 MCG: 50 INJECTION, SOLUTION INTRAMUSCULAR; INTRAVENOUS at 10:01

## 2018-01-30 RX ADMIN — TROPICAMIDE 1 DROP: 10 SOLUTION/ DROPS OPHTHALMIC at 10:01

## 2018-01-30 RX ADMIN — LIDOCAINE HYDROCHLORIDE 1 ML: 10 INJECTION, SOLUTION EPIDURAL; INFILTRATION; INTRACAUDAL; PERINEURAL at 10:01

## 2018-01-30 RX ADMIN — LIDOCAINE HYDROCHLORIDE 4 MG: 40 INJECTION, SOLUTION RETROBULBAR; TOPICAL at 10:01

## 2018-01-30 RX ADMIN — PROPARACAINE HYDROCHLORIDE 1 DROP: 5 SOLUTION/ DROPS OPHTHALMIC at 10:01

## 2018-01-30 RX ADMIN — LIDOCAINE HYDROCHLORIDE 1 MG: 10 INJECTION, SOLUTION EPIDURAL; INFILTRATION; INTRACAUDAL; PERINEURAL at 10:01

## 2018-01-30 RX ADMIN — PROPARACAINE HYDROCHLORIDE 1 DROP: 5 SOLUTION/ DROPS OPHTHALMIC at 09:01

## 2018-01-30 RX ADMIN — PHENYLEPHRINE AND KETOROLAC 4 ML: 10.16; 2.88 INJECTION, SOLUTION, CONCENTRATE INTRAOCULAR at 10:01

## 2018-01-30 RX ADMIN — MIDAZOLAM HYDROCHLORIDE 1 MG: 1 INJECTION, SOLUTION INTRAMUSCULAR; INTRAVENOUS at 10:01

## 2018-01-30 RX ADMIN — ONDANSETRON 4 MG: 2 INJECTION, SOLUTION INTRAMUSCULAR; INTRAVENOUS at 10:01

## 2018-01-30 RX ADMIN — SODIUM CHLORIDE, SODIUM LACTATE, POTASSIUM CHLORIDE, CALCIUM CHLORIDE: 600; 310; 30; 20 INJECTION, SOLUTION INTRAVENOUS at 10:01

## 2018-01-30 RX ADMIN — PHENYLEPHRINE HYDROCHLORIDE 1 DROP: 25 SOLUTION/ DROPS OPHTHALMIC at 10:01

## 2018-01-30 RX ADMIN — BALANCED SALT SOLUTION 500 ML: 6.4; .75; .48; .3; 3.9; 1.7 SOLUTION OPHTHALMIC at 10:01

## 2018-01-30 RX ADMIN — PHENYLEPHRINE HYDROCHLORIDE 1 DROP: 25 SOLUTION/ DROPS OPHTHALMIC at 09:01

## 2018-01-30 RX ADMIN — PREDNISOLONE ACETATE 1 DROP: 10 SUSPENSION OPHTHALMIC at 10:01

## 2018-01-30 RX ADMIN — SODIUM HYALURONATE 0.8 MG: 10 INJECTION INTRAOCULAR at 10:01

## 2018-01-30 RX ADMIN — Medication 0.5 ML: at 10:01

## 2018-01-30 RX ADMIN — OFLOXACIN 1 DROP: 3 SOLUTION/ DROPS OPHTHALMIC at 10:01

## 2018-01-30 RX ADMIN — KETOROLAC TROMETHAMINE 1 DROP: 5 SOLUTION/ DROPS OPHTHALMIC at 10:01

## 2018-01-30 NOTE — TRANSFER OF CARE
"Anesthesia Transfer of Care Note    Patient: Sascha Martinez    Procedure(s) Performed: Procedure(s) (LRB):  PHACOEMULSIFICATION-ASPIRATION-CATARACT (Right)  INSERTION-INTRAOCULAR LENS (IOL) (Right)    Patient location: PACU    Anesthesia Type: MAC    Transport from OR: Transported from OR on room air with adequate spontaneous ventilation    Post pain: adequate analgesia    Post assessment: no apparent anesthetic complications    Post vital signs: stable    Level of consciousness: awake    Nausea/Vomiting: no nausea/vomiting    Complications: none    Transfer of care protocol was followed      Last vitals:   Visit Vitals  BP (!) 180/83 (BP Location: Left arm, Patient Position: Lying)   Pulse 66   Temp 36.3 °C (97.3 °F) (Skin)   Resp 20   Ht 5' 2" (1.575 m)   Wt 51.7 kg (114 lb)   SpO2 98%   Breastfeeding? No   BMI 20.85 kg/m²     "

## 2018-01-30 NOTE — OP NOTE
SURGEON: BRIAN TELLO MD    DATE OF PROCEDURE: 01/30/2018    PREOPERATIVE DIAGNOSIS:  1. Senile nuclear sclerotic cataract right eye.  2. Presbyopia right eye    POSTOPERATIVE DIAGNOSIS: Senile nuclear sclerotic cataract right eye. 2. Presbyopia right eye    PROCEDURE PERFORMED:  Phacoemulsification with placement of MULTIFOCAL intraocular lens, right eye.    IMPLANT:  ZLBOO 21.5 D    ANESTHESIA:  Topical and MAC    COMPLICATIONS: none    ESTIMATED BLOOD LOSS: <1cc    SPECIMENS: none    INDICATIONS FOR PROCEDURE:  This patient presented to the clinic with decreased vision in the right eye and was found to have a cataract.  The risks, benefits, and alternatives were discussed and the patient agreed to proceed with phacoemulsification and implantation of a lens in the right eye.     PROCEDURE IN DETAIL:  The patient was met in the preop holding area.  Consent was confirmed to be signed.  The operative site was marked.  The patient was brought into the operating room by the anesthesia team and placed under monitored anesthesia care.  The right eye was prepped and draped in a sterile ophthalmic fashion.  A Dion speculum was placed into the right eye.   A paracentesis site was made and 1% preservative-free lidocaine was injected into the anterior chamber.  Viscoelastic  material was injected into the anterior chamber.  A keratome blade was used to make a clear corneal incision.  A cystotome was used to initiate the continuous curvilinear capsulorrhexis which was completed with Utrata forceps.  BSS on a donald cannula was used to perform hydrodissection.  The phacoemulsification tip was introduced into the eye and the nucleus was removed in a standard divide-and-conquer fashion.  Remaining cortical material was removed from the eye using irrigation-aspiration.  The capsular bag was filled with viscoelastic material and the intraocular lens was injected and positioned into place. Remaining viscoelastic material was  removed from the eye using irrigation and aspiration.  The corneal wounds were hydrated.  The eye was filled to physiologic pressure. The wounds were found to be watertight. Drops of Vigamox and prednisilone were placed into the eye.  The eye was washed, dried, and shielded.  The patient tolerated the procedure well and knows to follow up with me tomorrow morning, sooner if needed.

## 2018-01-30 NOTE — H&P
History    Chief complaint:  Painless progressive vision loss    Present Ilness/Diagnosis: Nuclear sclerotic Cataract    Past Medical History:  has a past medical history of Arthritis; Carpal tunnel syndrome, bilateral; Cataract; DVT (deep venous thrombosis); Endometrial thickening on ultra sound (2013); GERD (gastroesophageal reflux disease); Hyperlipidemia; Migraine headache; Osteoporosis; and PVD (posterior vitreous detachment).    Family History/Social History: refer to chart    Allergies:   Review of patient's allergies indicates:   Allergen Reactions    Ketorolac      Other reaction(s): Rash    Cefazolin Rash    Codeine Nausea And Vomiting     Other reaction(s): Nausea       Current Medications: No current facility-administered medications for this encounter.     Current Outpatient Prescriptions:     b complex vitamins capsule, Take 1 capsule by mouth once daily., Disp: , Rfl:     calcium carbonate (OS-GINI) 600 mg (1,500 mg) Tab, Take 600 mg by mouth once daily., Disp: , Rfl:     ERGOCALCIFEROL, VITAMIN D2, (VITAMIN D ORAL), Take 1 tablet by mouth Daily., Disp: , Rfl:     levocetirizine (XYZAL) 5 MG tablet, Take 1 tablet (5 mg total) by mouth every evening., Disp: 30 tablet, Rfl: 3    naproxen sodium (ANAPROX) 220 MG tablet, Take 220 mg by mouth 2 (two) times daily with meals. Aleve over the counter, Disp: , Rfl:     ketorolac 0.5% (ACULAR) 0.5 % Drop, Place 1 drop into the right eye 3 (three) times daily., Disp: 5 mL, Rfl: 1    Physical Exam    BP: Vital signs stable  General: No apparent distress  HEENT: nuclear sclerotic cataract  Lungs: adequate respirations  Heart: + pulses  Abdomen: soft  Rectal/pelvic: deferred    Impression: Visually significant Cataract    Plan: Phacoemulsification with implantation of Intraocular lens

## 2018-01-30 NOTE — ANESTHESIA POSTPROCEDURE EVALUATION
"Anesthesia Post Evaluation    Patient: Sascha Martinez    Procedure(s) Performed: Procedure(s) (LRB):  PHACOEMULSIFICATION-ASPIRATION-CATARACT (Right)  INSERTION-INTRAOCULAR LENS (IOL) (Right)    Final Anesthesia Type: MAC  Patient location during evaluation: PACU  Patient participation: Yes- Able to Participate  Level of consciousness: awake and alert and oriented  Post-procedure vital signs: reviewed and stable  Pain management: adequate  Airway patency: patent  PONV status at discharge: No PONV  Anesthetic complications: no      Cardiovascular status: blood pressure returned to baseline  Respiratory status: unassisted, spontaneous ventilation and room air  Hydration status: euvolemic  Follow-up not needed.        Visit Vitals  BP (!) 165/79 (BP Location: Left arm, Patient Position: Sitting)   Pulse (!) 58   Temp 36.3 °C (97.3 °F) (Skin)   Resp 16   Ht 5' 2" (1.575 m)   Wt 51.7 kg (114 lb)   SpO2 100%   Breastfeeding? No   BMI 20.85 kg/m²       Pain/Israel Score: Pain Assessment Performed: Yes (1/30/2018 11:11 AM)  Presence of Pain: denies (1/30/2018 11:11 AM)  Israel Score: 10 (1/30/2018 11:11 AM)      "

## 2018-01-30 NOTE — DISCHARGE INSTRUCTIONS
See pre-printed instructions.      Recovery After Procedural Sedation (Adult)  You have been given medicine by vein to make you sleep during your surgery. This may have included both a pain medicine and sleeping medicine. Most of the effects have worn off. But you may still have some drowsiness for the next 6 to 8 hours.  Home care  Follow these guidelines when you get home:  · For the next 8 hours, you should be watched by a responsible adult. This person should make sure your condition is not getting worse.  · Don't drink any alcohol for the next 24 hours.  · Don't drive, operate dangerous machinery, or make important business or personal decisions during the next 24 hours.  Note: Your healthcare provider may tell you not to take any medicine by mouth for pain or sleep in the next 4 hours. These medicines may react with the medicines you were given in the hospital. This could cause a much stronger response than usual.  Follow-up care  Follow up with your healthcare provider if you are not alert and back to your usual level of activity within 12 hours.  When to seek medical advice  Call your healthcare provider right away if any of these occur:  · Drowsiness gets worse  · Weakness or dizziness gets worse  · Repeated vomiting  · You can't be awakened   Date Last Reviewed: 10/18/2016  © 8900-4950 [employer]. [address] [place] [address], [place] [address]. All rights reserved. This information is not intended as a substitute for professional medical care. Always follow your healthcare professional's instructions.

## 2018-01-30 NOTE — ANESTHESIA PREPROCEDURE EVALUATION
01/30/2018  Sascha Martinez is a 78 y.o., female.    Anesthesia Evaluation    I have reviewed the Patient Summary Reports.    I have reviewed the Nursing Notes.   I have reviewed the Medications.     Review of Systems  Anesthesia Hx:  No problems with previous Anesthesia    Social:  Non-Smoker    Hepatic/GI:   GERD    Neurological:   Neuromuscular Disease, Headaches  Dementia mild        Physical Exam  General:  Well nourished    Airway/Jaw/Neck:  Airway Findings: Mouth Opening: Normal Tongue: Normal  General Airway Assessment: Adult, Good  Mallampati: II  Improves to II with phonation.  TM Distance: 4-6 cm      Dental:  Dental Findings: In tact   Chest/Lungs:  Chest/Lungs Findings: Clear to auscultation, Normal Respiratory Rate     Heart/Vascular:  Heart Findings: Rate: Normal  Rhythm: Regular Rhythm  Sounds: Normal  Heart murmur: negative       Mental Status:  Mental Status Findings:  Cooperative, Alert and Oriented         Anesthesia Plan  Type of Anesthesia, risks & benefits discussed:  Anesthesia Type:  general  Patient's Preference:   Intra-op Monitoring Plan: standard ASA monitors  Intra-op Monitoring Plan Comments:   Post Op Pain Control Plan:   Post Op Pain Control Plan Comments:   Induction:    Beta Blocker:  Patient is not currently on a Beta-Blocker (No further documentation required).       Informed Consent: Patient understands risks and agrees with Anesthesia plan.  Questions answered. Anesthesia consent signed with patient.  ASA Score: 2     Day of Surgery Review of History & Physical: I have interviewed and examined the patient. I have reviewed the patient's H&P dated:  There are no significant changes.          Ready For Surgery From Anesthesia Perspective.

## 2018-01-30 NOTE — OR NURSING
Pt BP elevated. Dr. Tomas notified and reports ok to continue with administration of eye drops per MD order.

## 2018-01-30 NOTE — DISCHARGE SUMMARY
BRIEF DISCHARGE NOTE:    Reason for hospitalization -  Cataract surgery     Final Diagnosis - Visually significant Cataract    Procedures and treatment provided - Status post phacoemulsification with placement of intraocular lens     Diet - Advance to regular as tolerated    Activity - as tolerated    Disposition at the end of the case - Good.    Discharge: to home    The patient tolerated the procedure well and knows to follow up with me tomorrow morning in the eye clinic, sooner if needed.    Patient and family instructions (as appropriate) - Given to patient on discharge    Miesha Tomas MD

## 2018-01-31 NOTE — PROGRESS NOTES
HPI     MILO De La Cruz, OD (I have seen pt's  before -he had cat sx with   JN)     S/p phaco/IOL OD - MFIOL - 1/30/18      Last edited by Miesha Tomas MD on 1/31/2018 11:08 AM. (History)            Assessment /Plan     For exam results, see Encounter Report.    Status post cataract extraction and insertion of intraocular lens, right    Nuclear sclerotic cataract of left eye      Slit Lamp Exam  L/L - normal  C/s - quiet  Cornea - clear  A/C - 1+ cell  Lens - PCIOL    POD #0 s/p phaco/IOL  - doing well  - continue the following drops:    vigamox or ocuflox TID x 1 wk then stop  Pred forte or durezol or dexamethasone TID x  4 wks  Ketorolac TID until runs out    Appropriate precautions and post op medications reviewed.  Patient instructed to call or come in if symptoms of redness, decreased vision, or pain are experienced.    -f/up 1-2wks, sooner PRN.  Va/IOP OD // LELE OS.    Sign consent for sx OD - determine if pt happy with reading OS.

## 2018-02-14 ENCOUNTER — OFFICE VISIT (OUTPATIENT)
Dept: OPHTHALMOLOGY | Facility: CLINIC | Age: 78
End: 2018-02-14
Payer: MEDICARE

## 2018-02-14 DIAGNOSIS — Z96.1 STATUS POST CATARACT EXTRACTION AND INSERTION OF INTRAOCULAR LENS, RIGHT: Primary | ICD-10-CM

## 2018-02-14 DIAGNOSIS — Z98.41 STATUS POST CATARACT EXTRACTION AND INSERTION OF INTRAOCULAR LENS, RIGHT: Primary | ICD-10-CM

## 2018-02-14 DIAGNOSIS — H25.12 NUCLEAR SCLEROTIC CATARACT OF LEFT EYE: ICD-10-CM

## 2018-02-14 PROCEDURE — 99999 PR PBB SHADOW E&M-EST. PATIENT-LVL III: CPT | Mod: PBBFAC,,, | Performed by: OPHTHALMOLOGY

## 2018-02-14 PROCEDURE — 99024 POSTOP FOLLOW-UP VISIT: CPT | Mod: POP,,, | Performed by: OPHTHALMOLOGY

## 2018-02-14 PROCEDURE — 92136 OPHTHALMIC BIOMETRY: CPT | Mod: 50,PBBFAC,PO | Performed by: OPHTHALMOLOGY

## 2018-02-14 PROCEDURE — 99213 OFFICE O/P EST LOW 20 MIN: CPT | Mod: PBBFAC,PO,25 | Performed by: OPHTHALMOLOGY

## 2018-02-14 RX ORDER — PREDNISOLONE ACETATE 10 MG/ML
1 SUSPENSION/ DROPS OPHTHALMIC 3 TIMES DAILY
COMMUNITY
End: 2018-08-31

## 2018-02-14 NOTE — PROGRESS NOTES
HPI     Post-op Evaluation    Additional comments: 2 wk s/p phaco iol OD 1/30           Comments   MILO De La Cruz, OD     S/p phaco/IOL OD - MFIOL - 1/30/18   NS OS    Pt states seeing well and can read some OD. No pain, floaters or flashes.     Gtts: Ketorolac, Prednisolone TID OD       Last edited by Cheryle Quintana on 2/14/2018 11:17 AM. (History)            Assessment /Plan     For exam results, see Encounter Report.    Status post cataract extraction and insertion of intraocular lens, right    Nuclear sclerotic cataract of left eye  -     IOL Master - OU - Both Eyes      PO week #1 s/p phaco/IOL -    - doing well, no issues  - okay to d/c abx gtt  - continue PF/ketoroloc TID for total of 1 month    - f/up 3-4 wks for MRx, DFE    Visually significant nuclear sclerotic cataract   - Interfering with activities of daily living.  Pt desires cataract surgery for Va rehabilitation.   - R/B/A discussed and pt agrees to proceed with surgery.   - IOL options discussed according to patient's goals and concomitant ocular pathology; and pt content with mfiol lens.    - Target: plano.    zmboo 21.5 OS    Discussed risk of glare / halo after sx, pt understands and wishes to proceed.  Agrees to fee for service cost of $1750 per eye.

## 2018-02-23 ENCOUNTER — TELEPHONE (OUTPATIENT)
Dept: OPHTHALMOLOGY | Facility: CLINIC | Age: 78
End: 2018-02-23

## 2018-02-23 DIAGNOSIS — H25.12 NUCLEAR SCLEROTIC CATARACT OF LEFT EYE: Primary | ICD-10-CM

## 2018-02-26 ENCOUNTER — OFFICE VISIT (OUTPATIENT)
Dept: FAMILY MEDICINE | Facility: CLINIC | Age: 78
End: 2018-02-26
Payer: MEDICARE

## 2018-02-26 VITALS
TEMPERATURE: 98 F | DIASTOLIC BLOOD PRESSURE: 84 MMHG | HEART RATE: 60 BPM | HEIGHT: 62 IN | SYSTOLIC BLOOD PRESSURE: 126 MMHG | WEIGHT: 115.31 LBS | BODY MASS INDEX: 21.22 KG/M2

## 2018-02-26 DIAGNOSIS — Z01.818 PRE-OP EXAM: Primary | ICD-10-CM

## 2018-02-26 DIAGNOSIS — H25.12 NUCLEAR SENILE CATARACT OF LEFT EYE: ICD-10-CM

## 2018-02-26 PROCEDURE — 99214 OFFICE O/P EST MOD 30 MIN: CPT | Mod: S$PBB,,, | Performed by: NURSE PRACTITIONER

## 2018-02-26 PROCEDURE — 99999 PR PBB SHADOW E&M-EST. PATIENT-LVL III: CPT | Mod: PBBFAC,,, | Performed by: NURSE PRACTITIONER

## 2018-02-26 PROCEDURE — 99213 OFFICE O/P EST LOW 20 MIN: CPT | Mod: PBBFAC,PO | Performed by: NURSE PRACTITIONER

## 2018-02-26 NOTE — PROGRESS NOTES
Subjective:       Patient ID: Sascah Martinez is a 78 y.o. female.  She was last seen in primary care by Dr. Varela on 02/03/2017. I last saw her on 01/05/2018 for a pre-op clearance.   Chief Complaint: Pre-op Exam    HPI   She states no changes in health status since last clearance on 01/05/2018.   She is here for left eye surgery clearance. States no complications with prior right eye surgery.  Vitals:    02/26/18 0742   BP: 126/84   Pulse: 60   Temp: 97.9 °F (36.6 °C)     BP Readings from Last 3 Encounters:   02/26/18 126/84   01/30/18 (!) 151/72   01/05/18 126/74       Past Medical History:   Diagnosis Date    Arthritis     right thumb,left elbow    Carpal tunnel syndrome, bilateral     Cataract     OS    DVT (deep venous thrombosis)     after hysterectomy    Endometrial thickening on ultra sound 2013    GERD (gastroesophageal reflux disease)     hiatal hernia    Hyperlipidemia     Migraine headache     Osteoporosis     PVD (posterior vitreous detachment)     OU     Lab Results   Component Value Date    WBC 5.94 01/08/2018    HGB 12.9 01/08/2018    HCT 39.1 01/08/2018    MCV 92 01/08/2018     (H) 01/08/2018     CMP  Sodium   Date Value Ref Range Status   01/08/2018 141 136 - 145 mmol/L Final     Potassium   Date Value Ref Range Status   01/08/2018 3.9 3.5 - 5.1 mmol/L Final     Chloride   Date Value Ref Range Status   01/08/2018 107 95 - 110 mmol/L Final     CO2   Date Value Ref Range Status   01/08/2018 28 23 - 29 mmol/L Final     Glucose   Date Value Ref Range Status   01/08/2018 78 70 - 110 mg/dL Final     BUN, Bld   Date Value Ref Range Status   01/08/2018 9 8 - 23 mg/dL Final     Creatinine   Date Value Ref Range Status   01/08/2018 0.7 0.5 - 1.4 mg/dL Final     Calcium   Date Value Ref Range Status   01/08/2018 9.2 8.7 - 10.5 mg/dL Final     Total Protein   Date Value Ref Range Status   01/08/2018 7.0 6.0 - 8.4 g/dL Final     Albumin   Date Value Ref Range Status   01/08/2018 3.7 3.5 -  5.2 g/dL Final     Total Bilirubin   Date Value Ref Range Status   2018 0.6 0.1 - 1.0 mg/dL Final     Comment:     For infants and newborns, interpretation of results should be based  on gestational age, weight and in agreement with clinical  observations.  Premature Infant recommended reference ranges:  Up to 24 hours.............<8.0 mg/dL  Up to 48 hours............<12.0 mg/dL  3-5 days..................<15.0 mg/dL  6-29 days.................<15.0 mg/dL       Alkaline Phosphatase   Date Value Ref Range Status   2018 59 55 - 135 U/L Final     AST   Date Value Ref Range Status   2018 15 10 - 40 U/L Final     ALT   Date Value Ref Range Status   2018 11 10 - 44 U/L Final     Anion Gap   Date Value Ref Range Status   2018 6 (L) 8 - 16 mmol/L Final     eGFR if    Date Value Ref Range Status   2018 >60.0 >60 mL/min/1.73 m^2 Final     eGFR if non    Date Value Ref Range Status   2018 >60.0 >60 mL/min/1.73 m^2 Final     Comment:     Calculation used to obtain the estimated glomerular filtration  rate (eGFR) is the CKD-EPI equation.        No results found for: LABA1C, HGBA1C  EK2013  Cardiac Clearance Needed:NO   Anticoagulants: NO     Review of Systems   Constitutional:        No voiced complaints during this visit       Objective:      Physical Exam   Constitutional: She is oriented to person, place, and time. Vital signs are normal. She appears well-developed and well-nourished. She is active and cooperative.   HENT:   Head: Normocephalic and atraumatic.   Right Ear: Hearing, tympanic membrane, external ear and ear canal normal.   Left Ear: Hearing, tympanic membrane, external ear and ear canal normal.   Nose: Nose normal.   Mouth/Throat: Uvula is midline, oropharynx is clear and moist and mucous membranes are normal.   Eyes: Conjunctivae and lids are normal. Pupils are equal, round, and reactive to light. Right eye exhibits no chemosis,  no discharge, no exudate and no hordeolum. No foreign body present in the right eye. Left eye exhibits no chemosis, no discharge, no exudate and no hordeolum. No foreign body present in the left eye. No scleral icterus.   Neck: Trachea normal, normal range of motion, full passive range of motion without pain and phonation normal. Neck supple.   Cardiovascular: Normal rate, regular rhythm and normal heart sounds.    Pulmonary/Chest: Effort normal and breath sounds normal.   Abdominal: Soft. Normal appearance and bowel sounds are normal. There is no splenomegaly or hepatomegaly. There is no tenderness.   Musculoskeletal: Normal range of motion.   Lymphadenopathy:        Head (right side): No submental, no submandibular, no tonsillar, no preauricular, no posterior auricular and no occipital adenopathy present.        Head (left side): No submental, no submandibular, no tonsillar, no preauricular, no posterior auricular and no occipital adenopathy present.     She has no cervical adenopathy.   Neurological: She is alert and oriented to person, place, and time. No cranial nerve deficit or sensory deficit.   Skin: Skin is warm, dry and intact.   Psychiatric: She has a normal mood and affect. Her speech is normal and behavior is normal. Judgment and thought content normal. Cognition and memory are normal.   Nursing note and vitals reviewed.      Assessment & Plan:       Pre-op exam    Nuclear senile cataract of left eye    Cleared for cataract surgery       Follow-up if symptoms worsen or fail to improve.

## 2018-02-28 RX ORDER — OFLOXACIN 3 MG/ML
1 SOLUTION/ DROPS OPHTHALMIC 3 TIMES DAILY
Qty: 5 ML | Refills: 0 | Status: SHIPPED | OUTPATIENT
Start: 2018-02-28 | End: 2018-03-10

## 2018-03-07 ENCOUNTER — TELEPHONE (OUTPATIENT)
Dept: NEUROLOGY | Facility: CLINIC | Age: 78
End: 2018-03-07

## 2018-03-07 NOTE — TELEPHONE ENCOUNTER
Called and spoke with patient . Informed him that Dr. Ervin does not see patients regarding memory problems. I rescheduled with Brittaney Morrissey. Patient expressed understanding.

## 2018-03-08 NOTE — H&P
History    Chief complaint:  Painless progressive vision loss    Present Ilness/Diagnosis: Nuclear sclerotic Cataract    Past Medical History:  has a past medical history of Arthritis; Carpal tunnel syndrome, bilateral; Cataract; DVT (deep venous thrombosis); Endometrial thickening on ultra sound (2013); GERD (gastroesophageal reflux disease); Hyperlipidemia; Migraine headache; Osteoporosis; and PVD (posterior vitreous detachment).    Family History/Social History: refer to chart    Allergies:   Review of patient's allergies indicates:   Allergen Reactions    Ketorolac      Other reaction(s): Rash    Cefazolin Rash    Codeine Nausea And Vomiting     Other reaction(s): Nausea       Current Medications: No current facility-administered medications for this encounter.     Current Outpatient Prescriptions:     b complex vitamins capsule, Take 1 capsule by mouth once daily., Disp: , Rfl:     calcium carbonate (OS-GINI) 600 mg (1,500 mg) Tab, Take 600 mg by mouth once daily., Disp: , Rfl:     ketorolac 0.5% (ACULAR) 0.5 % Drop, Place 1 drop into the right eye 3 (three) times daily., Disp: 5 mL, Rfl: 1    naproxen sodium (ANAPROX) 220 MG tablet, Take 220 mg by mouth 2 (two) times daily with meals. Aleve over the counter, Disp: , Rfl:     ofloxacin (OCUFLOX) 0.3 % ophthalmic solution, PLACE 1 DROP INTO THE RIGHT EYE 3 (THREE) TIMES DAILY., Disp: 5 mL, Rfl: 0    prednisoLONE acetate (PRED FORTE) 1 % DrpS, Place 1 drop into the right eye 3 (three) times daily., Disp: , Rfl:     Physical Exam    BP: Vital signs stable  General: No apparent distress  HEENT: nuclear sclerotic cataract  Lungs: adequate respirations  Heart: + pulses  Abdomen: soft  Rectal/pelvic: deferred    Impression: Visually significant Cataract    Plan: Phacoemulsification with implantation of Intraocular lens

## 2018-03-12 ENCOUNTER — ANESTHESIA EVENT (OUTPATIENT)
Dept: SURGERY | Facility: HOSPITAL | Age: 78
End: 2018-03-12
Payer: MEDICARE

## 2018-03-13 ENCOUNTER — HOSPITAL ENCOUNTER (OUTPATIENT)
Facility: HOSPITAL | Age: 78
Discharge: HOME | End: 2018-03-13
Attending: OPHTHALMOLOGY | Admitting: OPHTHALMOLOGY
Payer: MEDICARE

## 2018-03-13 ENCOUNTER — SURGERY (OUTPATIENT)
Age: 78
End: 2018-03-13

## 2018-03-13 ENCOUNTER — ANESTHESIA (OUTPATIENT)
Dept: SURGERY | Facility: HOSPITAL | Age: 78
End: 2018-03-13
Payer: MEDICARE

## 2018-03-13 VITALS
BODY MASS INDEX: 21.16 KG/M2 | SYSTOLIC BLOOD PRESSURE: 140 MMHG | HEART RATE: 66 BPM | TEMPERATURE: 97 F | WEIGHT: 115 LBS | DIASTOLIC BLOOD PRESSURE: 68 MMHG | HEIGHT: 62 IN | OXYGEN SATURATION: 99 % | RESPIRATION RATE: 18 BRPM

## 2018-03-13 DIAGNOSIS — H25.12 NUCLEAR SENILE CATARACT OF LEFT EYE: Primary | ICD-10-CM

## 2018-03-13 DIAGNOSIS — H25.10 SENILE NUCLEAR SCLEROSIS: ICD-10-CM

## 2018-03-13 PROCEDURE — 25000003 PHARM REV CODE 250: Mod: PO | Performed by: OPHTHALMOLOGY

## 2018-03-13 PROCEDURE — 71000033 HC RECOVERY, INTIAL HOUR: Mod: PO | Performed by: OPHTHALMOLOGY

## 2018-03-13 PROCEDURE — 25000003 PHARM REV CODE 250: Mod: PO | Performed by: ANESTHESIOLOGY

## 2018-03-13 PROCEDURE — 36000707: Mod: PO | Performed by: OPHTHALMOLOGY

## 2018-03-13 PROCEDURE — 99499 UNLISTED E&M SERVICE: CPT | Mod: ,,, | Performed by: OPHTHALMOLOGY

## 2018-03-13 PROCEDURE — D9220A PRA ANESTHESIA: Mod: CRNA,,, | Performed by: NURSE ANESTHETIST, CERTIFIED REGISTERED

## 2018-03-13 PROCEDURE — 37000008 HC ANESTHESIA 1ST 15 MINUTES: Mod: PO | Performed by: OPHTHALMOLOGY

## 2018-03-13 PROCEDURE — 63600175 PHARM REV CODE 636 W HCPCS: Mod: PO | Performed by: NURSE ANESTHETIST, CERTIFIED REGISTERED

## 2018-03-13 PROCEDURE — 37000009 HC ANESTHESIA EA ADD 15 MINS: Mod: PO | Performed by: OPHTHALMOLOGY

## 2018-03-13 PROCEDURE — V2788 PRESBYOPIA-CORRECT FUNCTION: HCPCS | Mod: PO | Performed by: OPHTHALMOLOGY

## 2018-03-13 PROCEDURE — 63600175 PHARM REV CODE 636 W HCPCS: Mod: PO | Performed by: OPHTHALMOLOGY

## 2018-03-13 PROCEDURE — 66984 XCAPSL CTRC RMVL W/O ECP: CPT | Mod: 79,LT,, | Performed by: OPHTHALMOLOGY

## 2018-03-13 PROCEDURE — 36000706: Mod: PO | Performed by: OPHTHALMOLOGY

## 2018-03-13 PROCEDURE — D9220A PRA ANESTHESIA: Mod: ANES,,, | Performed by: ANESTHESIOLOGY

## 2018-03-13 DEVICE — IMPLANTABLE DEVICE: Type: IMPLANTABLE DEVICE | Site: EYE | Status: FUNCTIONAL

## 2018-03-13 RX ORDER — TROPICAMIDE 10 MG/ML
1 SOLUTION/ DROPS OPHTHALMIC
Status: DISCONTINUED | OUTPATIENT
Start: 2018-03-13 | End: 2018-03-13 | Stop reason: HOSPADM

## 2018-03-13 RX ORDER — ACETAMINOPHEN 325 MG/1
650 TABLET ORAL EVERY 4 HOURS PRN
Status: DISCONTINUED | OUTPATIENT
Start: 2018-03-13 | End: 2018-03-13 | Stop reason: HOSPADM

## 2018-03-13 RX ORDER — KETOROLAC TROMETHAMINE 5 MG/ML
1 SOLUTION OPHTHALMIC ONCE
Status: DISCONTINUED | OUTPATIENT
Start: 2018-03-13 | End: 2018-03-13

## 2018-03-13 RX ORDER — ONDANSETRON 2 MG/ML
INJECTION INTRAMUSCULAR; INTRAVENOUS
Status: DISCONTINUED | OUTPATIENT
Start: 2018-03-13 | End: 2018-03-13

## 2018-03-13 RX ORDER — MOXIFLOXACIN 5 MG/ML
1 SOLUTION/ DROPS OPHTHALMIC
Status: ACTIVE | OUTPATIENT
Start: 2018-03-13 | End: 2018-03-13

## 2018-03-13 RX ORDER — SODIUM CHLORIDE 9 MG/ML
INJECTION, SOLUTION INTRAVENOUS CONTINUOUS
Status: DISCONTINUED | OUTPATIENT
Start: 2018-03-13 | End: 2018-03-13 | Stop reason: HOSPADM

## 2018-03-13 RX ORDER — OFLOXACIN 3 MG/ML
1 SOLUTION/ DROPS OPHTHALMIC
Status: COMPLETED | OUTPATIENT
Start: 2018-03-13 | End: 2018-03-13

## 2018-03-13 RX ORDER — LIDOCAINE HYDROCHLORIDE 10 MG/ML
0.5 INJECTION, SOLUTION EPIDURAL; INFILTRATION; INTRACAUDAL; PERINEURAL ONCE AS NEEDED
Status: COMPLETED | OUTPATIENT
Start: 2018-03-13 | End: 2018-03-13

## 2018-03-13 RX ORDER — OFLOXACIN 3 MG/ML
1 SOLUTION/ DROPS OPHTHALMIC
COMMUNITY
End: 2018-08-31

## 2018-03-13 RX ORDER — LIDOCAINE HYDROCHLORIDE 40 MG/ML
1 INJECTION, SOLUTION RETROBULBAR
Status: COMPLETED | OUTPATIENT
Start: 2018-03-13 | End: 2018-03-13

## 2018-03-13 RX ORDER — PREDNISOLONE ACETATE 10 MG/ML
SUSPENSION/ DROPS OPHTHALMIC
Status: DISCONTINUED | OUTPATIENT
Start: 2018-03-13 | End: 2018-03-13 | Stop reason: HOSPADM

## 2018-03-13 RX ORDER — PHENYLEPHRINE HYDROCHLORIDE 25 MG/ML
1 SOLUTION/ DROPS OPHTHALMIC
Status: DISCONTINUED | OUTPATIENT
Start: 2018-03-13 | End: 2018-03-13 | Stop reason: HOSPADM

## 2018-03-13 RX ORDER — LIDOCAINE HYDROCHLORIDE 40 MG/ML
INJECTION, SOLUTION RETROBULBAR
Status: DISCONTINUED | OUTPATIENT
Start: 2018-03-13 | End: 2018-03-13 | Stop reason: HOSPADM

## 2018-03-13 RX ORDER — SODIUM CHLORIDE, SODIUM LACTATE, POTASSIUM CHLORIDE, CALCIUM CHLORIDE 600; 310; 30; 20 MG/100ML; MG/100ML; MG/100ML; MG/100ML
INJECTION, SOLUTION INTRAVENOUS CONTINUOUS
Status: DISCONTINUED | OUTPATIENT
Start: 2018-03-13 | End: 2018-03-13 | Stop reason: HOSPADM

## 2018-03-13 RX ORDER — SODIUM CHLORIDE 0.9 % (FLUSH) 0.9 %
3 SYRINGE (ML) INJECTION
Status: DISCONTINUED | OUTPATIENT
Start: 2018-03-13 | End: 2018-03-13 | Stop reason: HOSPADM

## 2018-03-13 RX ORDER — LIDOCAINE HYDROCHLORIDE 10 MG/ML
INJECTION, SOLUTION EPIDURAL; INFILTRATION; INTRACAUDAL; PERINEURAL
Status: DISCONTINUED | OUTPATIENT
Start: 2018-03-13 | End: 2018-03-13 | Stop reason: HOSPADM

## 2018-03-13 RX ORDER — PROPARACAINE HYDROCHLORIDE 5 MG/ML
1 SOLUTION/ DROPS OPHTHALMIC
Status: DISCONTINUED | OUTPATIENT
Start: 2018-03-13 | End: 2018-03-13 | Stop reason: HOSPADM

## 2018-03-13 RX ORDER — MOXIFLOXACIN 5 MG/ML
SOLUTION/ DROPS OPHTHALMIC
Status: DISCONTINUED | OUTPATIENT
Start: 2018-03-13 | End: 2018-03-13 | Stop reason: HOSPADM

## 2018-03-13 RX ORDER — FENTANYL CITRATE 50 UG/ML
INJECTION, SOLUTION INTRAMUSCULAR; INTRAVENOUS
Status: DISCONTINUED | OUTPATIENT
Start: 2018-03-13 | End: 2018-03-13

## 2018-03-13 RX ORDER — EPINEPHRINE 1 MG/ML
INJECTION INTRAMUSCULAR; INTRAVENOUS; SUBCUTANEOUS
Status: DISCONTINUED | OUTPATIENT
Start: 2018-03-13 | End: 2018-03-13 | Stop reason: HOSPADM

## 2018-03-13 RX ORDER — MIDAZOLAM HYDROCHLORIDE 1 MG/ML
INJECTION, SOLUTION INTRAMUSCULAR; INTRAVENOUS
Status: DISCONTINUED | OUTPATIENT
Start: 2018-03-13 | End: 2018-03-13

## 2018-03-13 RX ORDER — OFLOXACIN 3 MG/ML
1 SOLUTION/ DROPS OPHTHALMIC
Status: DISPENSED | OUTPATIENT
Start: 2018-03-13 | End: 2018-03-13

## 2018-03-13 RX ADMIN — TROPICAMIDE 1 DROP: 10 SOLUTION/ DROPS OPHTHALMIC at 08:03

## 2018-03-13 RX ADMIN — MIDAZOLAM HYDROCHLORIDE 1 MG: 1 INJECTION, SOLUTION INTRAMUSCULAR; INTRAVENOUS at 09:03

## 2018-03-13 RX ADMIN — PROPARACAINE HYDROCHLORIDE 1 DROP: 5 SOLUTION/ DROPS OPHTHALMIC at 08:03

## 2018-03-13 RX ADMIN — EPINEPHRINE 0.3 MG: 1 INJECTION, SOLUTION INTRAMUSCULAR; INTRAVENOUS; SUBCUTANEOUS at 09:03

## 2018-03-13 RX ADMIN — LIDOCAINE HYDROCHLORIDE 1 MG: 10 INJECTION, SOLUTION EPIDURAL; INFILTRATION; INTRACAUDAL; PERINEURAL at 08:03

## 2018-03-13 RX ADMIN — PHENYLEPHRINE HYDROCHLORIDE 1 DROP: 25 SOLUTION/ DROPS OPHTHALMIC at 08:03

## 2018-03-13 RX ADMIN — ONDANSETRON 4 MG: 2 INJECTION, SOLUTION INTRAMUSCULAR; INTRAVENOUS at 09:03

## 2018-03-13 RX ADMIN — SODIUM HYALURONATE 0.8 MG: 10 INJECTION INTRAOCULAR at 09:03

## 2018-03-13 RX ADMIN — OFLOXACIN 1 DROP: 3 SOLUTION/ DROPS OPHTHALMIC at 08:03

## 2018-03-13 RX ADMIN — MOXIFLOXACIN HYDROCHLORIDE 1 DROP: 5 SOLUTION/ DROPS OPHTHALMIC at 09:03

## 2018-03-13 RX ADMIN — SODIUM CHLORIDE, SODIUM LACTATE, POTASSIUM CHLORIDE, CALCIUM CHLORIDE: 600; 310; 30; 20 INJECTION, SOLUTION INTRAVENOUS at 08:03

## 2018-03-13 RX ADMIN — BALANCED SALT SOLUTION 500 ML: 6.4; .75; .48; .3; 3.9; 1.7 SOLUTION OPHTHALMIC at 09:03

## 2018-03-13 RX ADMIN — LIDOCAINE HYDROCHLORIDE 1 MG: 40 INJECTION, SOLUTION RETROBULBAR; TOPICAL at 08:03

## 2018-03-13 RX ADMIN — LIDOCAINE HYDROCHLORIDE 1 ML: 10 INJECTION, SOLUTION EPIDURAL; INFILTRATION; INTRACAUDAL; PERINEURAL at 09:03

## 2018-03-13 RX ADMIN — FENTANYL CITRATE 50 MCG: 50 INJECTION, SOLUTION INTRAMUSCULAR; INTRAVENOUS at 09:03

## 2018-03-13 RX ADMIN — LIDOCAINE HYDROCHLORIDE 2 DROP: 40 SOLUTION RETROBULBAR; TOPICAL at 09:03

## 2018-03-13 RX ADMIN — Medication 0.5 ML: at 09:03

## 2018-03-13 RX ADMIN — PREDNISOLONE ACETATE 1 DROP: 10 SUSPENSION OPHTHALMIC at 09:03

## 2018-03-13 NOTE — DISCHARGE INSTRUCTIONS
Recovery After Procedural Sedation (Adult)  You have been given medicine by vein to make you sleep during your surgery. This may have included both a pain medicine and sleeping medicine. Most of the effects have worn off. But you may still have some drowsiness for the next 6 to 8 hours.  Home care  Follow these guidelines when you get home:  · For the next 8 hours, you should be watched by a responsible adult. This person should make sure your condition is not getting worse.  · Don't drink any alcohol for the next 24 hours.  · Don't drive, operate dangerous machinery, or make important business or personal decisions during the next 24 hours.  Note: Your healthcare provider may tell you not to take any medicine by mouth for pain or sleep in the next 4 hours. These medicines may react with the medicines you were given in the hospital. This could cause a much stronger response than usual.  Follow-up care  Follow up with your healthcare provider if you are not alert and back to your usual level of activity within 12 hours.  When to seek medical advice  Call your healthcare provider right away if any of these occur:  · Drowsiness gets worse  · Weakness or dizziness gets worse  · Repeated vomiting  · You can't be awakened   Date Last Reviewed: 10/18/2016  © 3657-4600 [email or web address] [address] [place] [address], [place] [address]. All rights reserved. This information is not intended as a substitute for professional medical care. Always follow your healthcare professional's instructions.    See eye sheet

## 2018-03-13 NOTE — OP NOTE
SURGEON: BRIAN TELLO MD    DATE OF PROCEDURE: 03/13/2018    PREOPERATIVE DIAGNOSIS:  1. Senile nuclear sclerotic cataract left eye.  2. Presbyopia left eye    POSTOPERATIVE DIAGNOSIS: 1.Senile nuclear sclerotic cataract left eye. 2. Presbyopia left eye    PROCEDURE PERFORMED:  Phacoemulsification with placement of MULTIFOCAL intraocular lens, left eye.    IMPLANT:   ZMBOO 21.5  D    ANESTHESIA:  Topical and MAC    COMPLICATIONS: none    ESTIMATED BLOOD LOSS: <1cc    SPECIMENS: none    INDICATIONS FOR PROCEDURE:  This patient presented to the clinic with decreased vision in the left eye and was found to have a cataract.  The risks, benefits, and alternatives were discussed and the patient agreed to proceed with phacoemulsification and implantation of a lens in the left eye.     PROCEDURE IN DETAIL:  The patient was met in the preop holding area.  Consent was confirmed to be signed.  The operative site was marked.  The patient was brought into the operating room by the anesthesia team and placed under monitored anesthesia care.  The left eye was prepped and draped in a sterile ophthalmic fashion.  A Dion speculum was placed into the left eye.   A paracentesis site was made and 1% preservative-free lidocaine was injected into the anterior chamber.  Viscoelastic  material was injected into the anterior chamber.  A keratome blade was used to make a clear corneal incision.  A cystotome was used to initiate the continuous curvilinear capsulorrhexis which was completed with Utrata forceps.  BSS on a donald cannula was used to perform hydrodissection.  The phacoemulsification tip was introduced into the eye and the nucleus was removed in a standard divide-and-conquer fashion.  Remaining cortical material was removed from the eye using irrigation-aspiration.  The capsular bag was filled with viscoelastic material and the intraocular lens was injected and positioned into place. Remaining viscoelastic material was  removed from the eye using irrigation and aspiration.  The corneal wounds were hydrated.  The eye was filled to physiologic pressure. The wounds were found to be watertight. Drops of Vigamox and prednisilone were placed into the eye.  The eye was washed, dried, and shielded.  The patient tolerated the procedure well and knows to follow up with me tomorrow morning, sooner if needed.

## 2018-03-13 NOTE — ANESTHESIA POSTPROCEDURE EVALUATION
"Anesthesia Post Evaluation    Patient: Sascha Martinez    Procedure(s) Performed: Procedure(s) (LRB):  PHACOEMULSIFICATION-ASPIRATION-CATARACT (Left)  INSERTION-INTRAOCULAR LENS (IOL) (Left)    Final Anesthesia Type: MAC  Patient location during evaluation: PACU  Patient participation: Yes- Able to Participate  Level of consciousness: awake and alert and oriented  Post-procedure vital signs: reviewed and stable  Pain management: adequate  Airway patency: patent  PONV status at discharge: No PONV  Anesthetic complications: no      Cardiovascular status: blood pressure returned to baseline  Respiratory status: unassisted, spontaneous ventilation and room air  Hydration status: euvolemic  Follow-up not needed.        Visit Vitals  BP (!) 149/72 (BP Location: Left arm)   Pulse 60   Temp 35.9 °C (96.7 °F) (Skin)   Resp 18   Ht 5' 2" (1.575 m)   Wt 52.2 kg (115 lb)   SpO2 99%   Breastfeeding? No   BMI 21.03 kg/m²       Pain/Israel Score: Pain Assessment Performed: Yes (3/13/2018  8:55 AM)  Presence of Pain: denies (3/13/2018  8:55 AM)      "

## 2018-03-13 NOTE — TRANSFER OF CARE
"Anesthesia Transfer of Care Note    Patient: Sascha Martinez    Procedure(s) Performed: Procedure(s) (LRB):  PHACOEMULSIFICATION-ASPIRATION-CATARACT (Left)  INSERTION-INTRAOCULAR LENS (IOL) (Left)    Patient location: PACU    Anesthesia Type: MAC    Transport from OR: Transported from OR on room air with adequate spontaneous ventilation    Post pain: adequate analgesia    Post assessment: no apparent anesthetic complications    Post vital signs: stable    Level of consciousness: awake, alert and oriented    Nausea/Vomiting: no nausea/vomiting    Complications: none    Transfer of care protocol was followed      Last vitals:   Visit Vitals  BP (!) 158/76 (BP Location: Right arm, Patient Position: Lying)   Pulse 65   Temp 36.3 °C (97.3 °F) (Skin)   Resp 18   Ht 5' 2" (1.575 m)   Wt 52.2 kg (115 lb)   SpO2 99%   Breastfeeding? No   BMI 21.03 kg/m²     "

## 2018-03-13 NOTE — ANESTHESIA POSTPROCEDURE EVALUATION
"Anesthesia Post Evaluation    Patient: Sascha Martinez    Procedure(s) Performed: Procedure(s) (LRB):  PHACOEMULSIFICATION-ASPIRATION-CATARACT (Left)  INSERTION-INTRAOCULAR LENS (IOL) (Left)    Final Anesthesia Type: MAC                Visit Vitals  BP (!) 158/76 (BP Location: Right arm, Patient Position: Lying)   Pulse 65   Temp 36.3 °C (97.3 °F) (Skin)   Resp 18   Ht 5' 2" (1.575 m)   Wt 52.2 kg (115 lb)   SpO2 99%   Breastfeeding? No   BMI 21.03 kg/m²       Pain/Israel Score: Pain Assessment Performed: Yes (3/13/2018  8:55 AM)  Presence of Pain: denies (3/13/2018  8:55 AM)      "

## 2018-03-13 NOTE — ANESTHESIA PREPROCEDURE EVALUATION
03/13/2018  Sascha Martinez is a 78 y.o., female.    Pre-op Assessment    I have reviewed the Patient Summary Reports.     I have reviewed the Nursing Notes.   I have reviewed the Medications.     Review of Systems  Anesthesia Hx:  No problems with previous Anesthesia    Social:  Non-Smoker    Hepatic/GI:   GERD    Neurological:   Neuromuscular Disease, Headaches  Dementia mild        Physical Exam  General:  Well nourished    Airway/Jaw/Neck:  Airway Findings: Mouth Opening: Normal Tongue: Normal  General Airway Assessment: Adult, Good  Mallampati: II  Improves to II with phonation.  TM Distance: 4-6 cm      Dental:  Dental Findings: In tact   Chest/Lungs:  Chest/Lungs Findings: Clear to auscultation, Normal Respiratory Rate     Heart/Vascular:  Heart Findings: Rate: Normal  Rhythm: Regular Rhythm  Sounds: Normal  Heart murmur: negative       Mental Status:  Mental Status Findings:  Cooperative, Alert and Oriented         Anesthesia Plan  Type of Anesthesia, risks & benefits discussed:  Anesthesia Type:  general  Patient's Preference:   Intra-op Monitoring Plan: standard ASA monitors  Intra-op Monitoring Plan Comments:   Post Op Pain Control Plan:   Post Op Pain Control Plan Comments:   Induction:    Beta Blocker:  Patient is not currently on a Beta-Blocker (No further documentation required).       Informed Consent: Patient understands risks and agrees with Anesthesia plan.  Questions answered. Anesthesia consent signed with patient.  ASA Score: 2     Day of Surgery Review of History & Physical: I have interviewed and examined the patient. I have reviewed the patient's H&P dated:  There are no significant changes.          Ready For Surgery From Anesthesia Perspective.

## 2018-03-14 ENCOUNTER — OFFICE VISIT (OUTPATIENT)
Dept: OPHTHALMOLOGY | Facility: CLINIC | Age: 78
End: 2018-03-14
Payer: MEDICARE

## 2018-03-14 DIAGNOSIS — Z96.1 STATUS POST CATARACT EXTRACTION AND INSERTION OF INTRAOCULAR LENS, LEFT: ICD-10-CM

## 2018-03-14 DIAGNOSIS — Z96.1 STATUS POST CATARACT EXTRACTION AND INSERTION OF INTRAOCULAR LENS, RIGHT: Primary | ICD-10-CM

## 2018-03-14 DIAGNOSIS — Z98.42 STATUS POST CATARACT EXTRACTION AND INSERTION OF INTRAOCULAR LENS, LEFT: ICD-10-CM

## 2018-03-14 DIAGNOSIS — Z98.41 STATUS POST CATARACT EXTRACTION AND INSERTION OF INTRAOCULAR LENS, RIGHT: Primary | ICD-10-CM

## 2018-03-14 PROCEDURE — 99999 PR PBB SHADOW E&M-EST. PATIENT-LVL II: CPT | Mod: PBBFAC,,, | Performed by: OPHTHALMOLOGY

## 2018-03-14 PROCEDURE — 99024 POSTOP FOLLOW-UP VISIT: CPT | Mod: POP,,, | Performed by: OPHTHALMOLOGY

## 2018-03-14 PROCEDURE — 99212 OFFICE O/P EST SF 10 MIN: CPT | Mod: PBBFAC,PO | Performed by: OPHTHALMOLOGY

## 2018-03-14 NOTE — PROGRESS NOTES
LOVE De La Cruz referral     1. S/p phaco/IOL OD - [facility] - 1/30/18   2. S/p phaco iol OS [facility] 3/13/18    1 day--Pt complains of slight eye pain under left eye. Pt was told not to   use Ketorolac yesterday due to allergy? Pt states she is not allergic to   Ketorlac and used to for her right eye.     Using gtts- PF TID os, Ocuflox TID OS       Last edited by Caron De La Rosa on 3/14/2018  9:57 AM. (History)            Assessment /Plan     For exam results, see Encounter Report.    Status post cataract extraction and insertion of intraocular lens, right    Status post cataract extraction and insertion of intraocular lens, left      Slit Lamp Exam  L/L - normal  C/s - quiet  Cornea - clear  A/C - 1+ cell  Lens - PCIOL    POD #1 s/p phaco/IOL  - doing well  - continue the following drops:    vigamox or ocuflox TID x 1 wk then stop  Pred forte or durezol or dexamethasone TID x  4 wks  Ketorolac TID until runs out    Appropriate precautions and post op medications reviewed.  Patient instructed to call or come in if symptoms of redness, decreased vision, or pain are experienced.    -f/up 4 wks, sooner PRN. dfe with dr. de la cruz

## 2018-04-13 ENCOUNTER — OFFICE VISIT (OUTPATIENT)
Dept: OPTOMETRY | Facility: CLINIC | Age: 78
End: 2018-04-13
Payer: MEDICARE

## 2018-04-13 DIAGNOSIS — Z96.1 S/P CATARACT EXTRACTION AND INSERTION OF INTRAOCULAR LENS, LEFT: Primary | ICD-10-CM

## 2018-04-13 DIAGNOSIS — Z98.42 S/P CATARACT EXTRACTION AND INSERTION OF INTRAOCULAR LENS, LEFT: Primary | ICD-10-CM

## 2018-04-13 PROCEDURE — 99024 POSTOP FOLLOW-UP VISIT: CPT | Mod: POP,,, | Performed by: OPTOMETRIST

## 2018-04-13 PROCEDURE — 99213 OFFICE O/P EST LOW 20 MIN: CPT | Mod: PBBFAC,PO | Performed by: OPTOMETRIST

## 2018-04-13 PROCEDURE — 99999 PR PBB SHADOW E&M-EST. PATIENT-LVL III: CPT | Mod: PBBFAC,,, | Performed by: OPTOMETRIST

## 2018-08-31 ENCOUNTER — LAB VISIT (OUTPATIENT)
Dept: LAB | Facility: HOSPITAL | Age: 78
End: 2018-08-31
Attending: NURSE PRACTITIONER
Payer: MEDICARE

## 2018-08-31 ENCOUNTER — OFFICE VISIT (OUTPATIENT)
Dept: FAMILY MEDICINE | Facility: CLINIC | Age: 78
End: 2018-08-31
Payer: MEDICARE

## 2018-08-31 VITALS
OXYGEN SATURATION: 99 % | DIASTOLIC BLOOD PRESSURE: 70 MMHG | WEIGHT: 113.31 LBS | BODY MASS INDEX: 20.85 KG/M2 | SYSTOLIC BLOOD PRESSURE: 108 MMHG | RESPIRATION RATE: 18 BRPM | TEMPERATURE: 98 F | HEIGHT: 62 IN | HEART RATE: 92 BPM

## 2018-08-31 DIAGNOSIS — N30.01 ACUTE CYSTITIS WITH HEMATURIA: ICD-10-CM

## 2018-08-31 DIAGNOSIS — R82.4 KETONURIA: ICD-10-CM

## 2018-08-31 DIAGNOSIS — R19.7 DIARRHEA, UNSPECIFIED TYPE: Primary | ICD-10-CM

## 2018-08-31 DIAGNOSIS — R30.0 DYSURIA: ICD-10-CM

## 2018-08-31 LAB
ALBUMIN SERPL BCP-MCNC: 4 G/DL
ALP SERPL-CCNC: 92 U/L
ALT SERPL W/O P-5'-P-CCNC: 15 U/L
ANION GAP SERPL CALC-SCNC: 11 MMOL/L
AST SERPL-CCNC: 15 U/L
BASOPHILS # BLD AUTO: 0.06 K/UL
BASOPHILS NFR BLD: 0.7 %
BILIRUB SERPL-MCNC: 0.7 MG/DL
BILIRUB SERPL-MCNC: ABNORMAL MG/DL
BLOOD URINE, POC: 250
BUN SERPL-MCNC: 12 MG/DL
CALCIUM SERPL-MCNC: 9.5 MG/DL
CHLORIDE SERPL-SCNC: 107 MMOL/L
CO2 SERPL-SCNC: 24 MMOL/L
COLOR, POC UA: YELLOW
CREAT SERPL-MCNC: 0.8 MG/DL
DIFFERENTIAL METHOD: ABNORMAL
EOSINOPHIL # BLD AUTO: 0.1 K/UL
EOSINOPHIL NFR BLD: 1.2 %
ERYTHROCYTE [DISTWIDTH] IN BLOOD BY AUTOMATED COUNT: 12.9 %
EST. GFR  (AFRICAN AMERICAN): >60 ML/MIN/1.73 M^2
EST. GFR  (NON AFRICAN AMERICAN): >60 ML/MIN/1.73 M^2
GLUCOSE SERPL-MCNC: 85 MG/DL
GLUCOSE UR QL STRIP: ABNORMAL
HCT VFR BLD AUTO: 39.4 %
HGB BLD-MCNC: 13.5 G/DL
KETONES UR QL STRIP: ABNORMAL
LEUKOCYTE ESTERASE URINE, POC: ABNORMAL
LYMPHOCYTES # BLD AUTO: 2 K/UL
LYMPHOCYTES NFR BLD: 25.2 %
MCH RBC QN AUTO: 30.6 PG
MCHC RBC AUTO-ENTMCNC: 34.3 G/DL
MCV RBC AUTO: 89 FL
MONOCYTES # BLD AUTO: 0.9 K/UL
MONOCYTES NFR BLD: 10.7 %
NEUTROPHILS # BLD AUTO: 5 K/UL
NEUTROPHILS NFR BLD: 62.2 %
NITRITE, POC UA: ABNORMAL
PH, POC UA: 5
PLATELET # BLD AUTO: 398 K/UL
PMV BLD AUTO: 8.8 FL
POTASSIUM SERPL-SCNC: 3.8 MMOL/L
PROT SERPL-MCNC: 7.4 G/DL
PROTEIN, POC: ABNORMAL
RBC # BLD AUTO: 4.41 M/UL
SODIUM SERPL-SCNC: 142 MMOL/L
SPECIFIC GRAVITY, POC UA: 1.03
UROBILINOGEN, POC UA: ABNORMAL
WBC # BLD AUTO: 8.06 K/UL

## 2018-08-31 PROCEDURE — 85025 COMPLETE CBC W/AUTO DIFF WBC: CPT | Mod: PO

## 2018-08-31 PROCEDURE — 36415 COLL VENOUS BLD VENIPUNCTURE: CPT | Mod: PO

## 2018-08-31 PROCEDURE — 99214 OFFICE O/P EST MOD 30 MIN: CPT | Mod: PBBFAC,PO | Performed by: NURSE PRACTITIONER

## 2018-08-31 PROCEDURE — 99214 OFFICE O/P EST MOD 30 MIN: CPT | Mod: S$PBB,,, | Performed by: NURSE PRACTITIONER

## 2018-08-31 PROCEDURE — 81002 URINALYSIS NONAUTO W/O SCOPE: CPT | Mod: PBBFAC,PO | Performed by: NURSE PRACTITIONER

## 2018-08-31 PROCEDURE — 80053 COMPREHEN METABOLIC PANEL: CPT | Mod: PO

## 2018-08-31 PROCEDURE — 99999 PR PBB SHADOW E&M-EST. PATIENT-LVL IV: CPT | Mod: PBBFAC,,, | Performed by: NURSE PRACTITIONER

## 2018-08-31 RX ORDER — DOXYCYCLINE HYCLATE 100 MG
100 TABLET ORAL 2 TIMES DAILY
Qty: 14 TABLET | Refills: 0 | Status: SHIPPED | OUTPATIENT
Start: 2018-08-31 | End: 2018-11-06

## 2018-08-31 RX ORDER — DICYCLOMINE HYDROCHLORIDE 20 MG/1
20 TABLET ORAL EVERY 6 HOURS
Qty: 40 TABLET | Refills: 0 | Status: SHIPPED | OUTPATIENT
Start: 2018-08-31 | End: 2018-09-10

## 2018-08-31 NOTE — PROGRESS NOTES
Subjective:       Patient ID: Sascha Martinez is a 78 y.o. female.    Chief Complaint: Diarrhea (for 2 days)    Here today with diarrhea- patient of Dr ballesteros - this is the first time I have seen her in clinic.      Diarrhea    This is a new problem. The current episode started in the past 7 days (3 days ). The problem occurs 2 to 4 times per day. The problem has been gradually worsening. The stool consistency is described as watery. The patient states that diarrhea does not awaken her from sleep. Associated symptoms include abdominal pain. Pertinent negatives include no arthralgias, bloating, chills, coughing, fever, headaches, increased  flatus, myalgias, sweats, URI, vomiting or weight loss. Risk factors include suspect food intake (be[place] - ribs ). The treatment provided no relief. There is no history of bowel resection, inflammatory bowel disease, irritable bowel syndrome, malabsorption, a recent abdominal surgery or short gut syndrome.     Vitals:    08/31/18 1205   BP: 108/70   Pulse: 92   Resp: 18   Temp: 98.3 °F (36.8 °C)     Review of Systems   Constitutional: Positive for appetite change. Negative for chills, diaphoresis, fatigue, fever and weight loss.   HENT: Negative.    Eyes: Negative.    Respiratory: Negative.  Negative for cough, shortness of breath and wheezing.    Cardiovascular: Negative.  Negative for chest pain.   Gastrointestinal: Positive for abdominal pain and diarrhea. Negative for bloating, flatus, nausea and vomiting.   Endocrine: Negative.    Genitourinary: Negative.  Negative for dysuria and hematuria.   Musculoskeletal: Negative.  Negative for arthralgias and myalgias.   Skin: Negative.  Negative for color change and rash.   Allergic/Immunologic: Negative.    Neurological: Negative.  Negative for speech difficulty, numbness and headaches.   Hematological: Negative.    Psychiatric/Behavioral: Negative.        Past Medical History:   Diagnosis Date    Arthritis     right  "thumb,left elbow    Carpal tunnel syndrome, bilateral     Cataract     OS    DVT (deep venous thrombosis)     after hysterectomy    Endometrial thickening on ultra sound 2013    GERD (gastroesophageal reflux disease)     hiatal hernia    Hyperlipidemia     Memory loss     "hard time finding words"    Migraine headache     Osteoporosis     PVD (posterior vitreous detachment)     OU     Objective:      Physical Exam   Constitutional: She appears well-developed and well-nourished.   HENT:   Head: Normocephalic and atraumatic.   Mouth/Throat: Oropharynx is clear and moist.   Eyes: Conjunctivae and EOM are normal. Pupils are equal, round, and reactive to light.   Neck: Neck supple.   Cardiovascular: Normal rate, regular rhythm, normal heart sounds and intact distal pulses. Exam reveals no friction rub.   No murmur heard.  Pulmonary/Chest: Effort normal and breath sounds normal. No stridor. No respiratory distress. She has no wheezes.   Abdominal: Soft. Bowel sounds are normal.   Musculoskeletal: Normal range of motion.   Neurological: She is alert.   Skin: Skin is warm and dry.   Psychiatric: Cognition and memory are impaired. She exhibits abnormal recent memory and abnormal remote memory.   She has issues understanding questions and responding appropriately -   Nursing note and vitals reviewed.      Assessment:       1. Diarrhea, unspecified type    2. Dysuria    3. Ketonuria    4. Acute cystitis with hematuria        Plan:       Diarrhea, unspecified type  -     Stool Exam-Ova,Cysts,Parasites; Future; Expected date: 08/31/2018  -     Stool culture; Future; Expected date: 08/31/2018  -     Giardia / Cryptosporidum, EIA; Future; Expected date: 08/31/2018  \  w-     Clostridium difficile EIA; Future; Expected date: 08/31/2018    Dysuria  -     POCT URINE DIPSTICK WITHOUT MICROSCOPE    Ketonuria  -     Comprehensive metabolic panel; Future; Expected date: 08/31/2018  -     CBC auto differential; Future; Expected " date: 08/31/2018    Acute cystitis with hematuria  -     doxycycline (VIBRA-TABS) 100 MG tablet; Take 1 tablet (100 mg total) by mouth 2 (two) times daily.  Dispense: 14 tablet; Refill: 0    -     dicyclomine (BENTYL) 20 mg tablet; Take 1 tablet (20 mg total) by mouth every 6 (six) hours. for 10 days  Dispense: 40 tablet; Refill: 0            She has some chronic changes with memory and cognitive issues   Difficulty completing task and following directions   Will treat for UTI and repeat UA in 2 weeks   Will get stool samples if diarrhea continues

## 2018-11-06 ENCOUNTER — LAB VISIT (OUTPATIENT)
Dept: LAB | Facility: HOSPITAL | Age: 78
End: 2018-11-06
Attending: NURSE PRACTITIONER
Payer: MEDICARE

## 2018-11-06 ENCOUNTER — OFFICE VISIT (OUTPATIENT)
Dept: FAMILY MEDICINE | Facility: CLINIC | Age: 78
End: 2018-11-06
Payer: MEDICARE

## 2018-11-06 VITALS
OXYGEN SATURATION: 98 % | WEIGHT: 112.63 LBS | SYSTOLIC BLOOD PRESSURE: 136 MMHG | DIASTOLIC BLOOD PRESSURE: 72 MMHG | HEIGHT: 62 IN | HEART RATE: 68 BPM | BODY MASS INDEX: 20.73 KG/M2

## 2018-11-06 DIAGNOSIS — R30.9 PAINFUL URINATION: Primary | ICD-10-CM

## 2018-11-06 DIAGNOSIS — R19.7 DIARRHEA, UNSPECIFIED TYPE: ICD-10-CM

## 2018-11-06 DIAGNOSIS — R10.30 LOWER ABDOMINAL PAIN: ICD-10-CM

## 2018-11-06 LAB
ALBUMIN SERPL BCP-MCNC: 3.8 G/DL
ALP SERPL-CCNC: 81 U/L
ALT SERPL W/O P-5'-P-CCNC: 9 U/L
ANION GAP SERPL CALC-SCNC: 10 MMOL/L
AST SERPL-CCNC: 17 U/L
BACTERIA #/AREA URNS HPF: ABNORMAL /HPF
BASOPHILS # BLD AUTO: 0.08 K/UL
BASOPHILS NFR BLD: 1.1 %
BILIRUB SERPL-MCNC: 0.4 MG/DL
BILIRUB UR QL STRIP: NEGATIVE
BUN SERPL-MCNC: 12 MG/DL
CALCIUM SERPL-MCNC: 9.2 MG/DL
CHLORIDE SERPL-SCNC: 107 MMOL/L
CLARITY UR: CLEAR
CO2 SERPL-SCNC: 23 MMOL/L
COLOR UR: YELLOW
CREAT SERPL-MCNC: 0.8 MG/DL
DIFFERENTIAL METHOD: ABNORMAL
EOSINOPHIL # BLD AUTO: 0.2 K/UL
EOSINOPHIL NFR BLD: 2 %
ERYTHROCYTE [DISTWIDTH] IN BLOOD BY AUTOMATED COUNT: 12.3 %
EST. GFR  (AFRICAN AMERICAN): >60 ML/MIN/1.73 M^2
EST. GFR  (NON AFRICAN AMERICAN): >60 ML/MIN/1.73 M^2
GLUCOSE SERPL-MCNC: 90 MG/DL
GLUCOSE UR QL STRIP: NEGATIVE
HCT VFR BLD AUTO: 39.4 %
HGB BLD-MCNC: 13 G/DL
HGB UR QL STRIP: NEGATIVE
IMM GRANULOCYTES # BLD AUTO: 0.01 K/UL
IMM GRANULOCYTES NFR BLD AUTO: 0.1 %
KETONES UR QL STRIP: NEGATIVE
LEUKOCYTE ESTERASE UR QL STRIP: ABNORMAL
LYMPHOCYTES # BLD AUTO: 2.5 K/UL
LYMPHOCYTES NFR BLD: 34.3 %
MCH RBC QN AUTO: 30 PG
MCHC RBC AUTO-ENTMCNC: 33 G/DL
MCV RBC AUTO: 91 FL
MICROSCOPIC COMMENT: ABNORMAL
MONOCYTES # BLD AUTO: 0.7 K/UL
MONOCYTES NFR BLD: 9 %
NEUTROPHILS # BLD AUTO: 4 K/UL
NEUTROPHILS NFR BLD: 53.5 %
NITRITE UR QL STRIP: NEGATIVE
NRBC BLD-RTO: 0 /100 WBC
PH UR STRIP: 6 [PH] (ref 5–8)
PLATELET # BLD AUTO: 390 K/UL
PMV BLD AUTO: 9.5 FL
POTASSIUM SERPL-SCNC: 4.1 MMOL/L
PROT SERPL-MCNC: 7.3 G/DL
PROT UR QL STRIP: NEGATIVE
RBC # BLD AUTO: 4.34 M/UL
SODIUM SERPL-SCNC: 140 MMOL/L
SP GR UR STRIP: >=1.03 (ref 1–1.03)
URN SPEC COLLECT METH UR: ABNORMAL
WBC # BLD AUTO: 7.41 K/UL
WBC #/AREA URNS HPF: 10 /HPF (ref 0–5)

## 2018-11-06 PROCEDURE — 99999 PR PBB SHADOW E&M-EST. PATIENT-LVL IV: CPT | Mod: PBBFAC,,, | Performed by: NURSE PRACTITIONER

## 2018-11-06 PROCEDURE — 85025 COMPLETE CBC W/AUTO DIFF WBC: CPT

## 2018-11-06 PROCEDURE — 99214 OFFICE O/P EST MOD 30 MIN: CPT | Mod: PBBFAC,PO,25 | Performed by: NURSE PRACTITIONER

## 2018-11-06 PROCEDURE — 80053 COMPREHEN METABOLIC PANEL: CPT

## 2018-11-06 PROCEDURE — 36415 COLL VENOUS BLD VENIPUNCTURE: CPT | Mod: PO

## 2018-11-06 PROCEDURE — 81000 URINALYSIS NONAUTO W/SCOPE: CPT | Mod: PO

## 2018-11-06 PROCEDURE — 99214 OFFICE O/P EST MOD 30 MIN: CPT | Mod: S$PBB,,, | Performed by: NURSE PRACTITIONER

## 2018-11-06 PROCEDURE — 90662 IIV NO PRSV INCREASED AG IM: CPT | Mod: PBBFAC,PO

## 2018-11-06 RX ORDER — CIPROFLOXACIN 250 MG/1
250 TABLET, FILM COATED ORAL 2 TIMES DAILY
Qty: 20 TABLET | Refills: 0 | Status: SHIPPED | OUTPATIENT
Start: 2018-11-06 | End: 2019-05-17

## 2018-11-06 RX ORDER — HYOSCYAMINE SULFATE 0.125 MG
125 TABLET ORAL EVERY 6 HOURS PRN
Qty: 60 TABLET | Refills: 0 | Status: SHIPPED | OUTPATIENT
Start: 2018-11-06 | End: 2019-05-17

## 2018-11-06 NOTE — PROGRESS NOTES
"Subjective:       Patient ID: Sascha Martinez is a 78 y.o. female.    Chief Complaint: Urinary Tract Infection; Diarrhea; and Abdominal Pain    Patient is a poor historian. She was seen for diarrhea in August, did not turn in stool culture, has not had follow up. Daughter says she is having diarrhea every time she eats. She is having urinary frequency, urgency, and burning for a few weeks. She complains about a "pinching sensation" in right groin. She was treated with doxycycline in august for uti.   Patient does have some memory issues, accompanied by her daughter today.     TETANUS VACCINE due on 01/13/1958  Zoster Vaccine due on 01/13/2000  Pneumococcal (65+)(2 of 2 - PPSV23) due on 12/01/2016  Influenza Vaccine due on 08/01/2018    Past Medical History:  Past Medical History:  No date: Arthritis      Comment:  right thumb,left elbow  No date: Carpal tunnel syndrome, bilateral  No date: Cataract      Comment:  OS  No date: DVT (deep venous thrombosis)      Comment:  after hysterectomy  2013: Endometrial thickening on ultra sound  No date: GERD (gastroesophageal reflux disease)      Comment:  hiatal hernia  No date: Hyperlipidemia  No date: Memory loss      Comment:  "hard time finding words"  No date: Migraine headache  No date: Osteoporosis  No date: PVD (posterior vitreous detachment)      Comment:  OU  Past Surgical History:  01/30/2018: CATARACT EXTRACTION W/  INTRAOCULAR LENS IMPLANT; Right      Comment:  Genoveva  03/13/2018: CATARACT EXTRACTION W/  INTRAOCULAR LENS IMPLANT; Left      Comment:  Genvoeva  No date: CHALAZION EXCISION      Comment:  LLL  2009: COLONOSCOPY  5/2013: DILATION AND CURETTAGE OF UTERUS      Comment:  complex hyperplasia of the uterus  5/28/2013: HYSTERECTOMY      Comment:  [facility]SO for complex hyperplasia  4/16/2013: HYSTEROSCOPY, WITH DILATION AND CURETTAGE OF UTERUS; N/A      Comment:  Performed by Morena Worley MD at [facility] OR  3/13/2018: INSERTION-INTRAOCULAR LENS (IOL); " Left      Comment:  Performed by Miesha Tomas MD at [facility] OR  1/30/2018: INSERTION-INTRAOCULAR LENS (IOL); Right      Comment:  Performed by Miesha Tomas MD at [facility] OR  2009: LIPOMA RESECTION      Comment:  back  1960's: NASAL POLYP SURGERY  No date: OOPHORECTOMY  3/13/2018: PHACOEMULSIFICATION-ASPIRATION-CATARACT; Left      Comment:  Performed by Miesha Tomas MD at [facility] OR  1/30/2018: PHACOEMULSIFICATION-ASPIRATION-CATARACT; Right      Comment:  Performed by Miesha Tomas MD at [facility] OR  Review of patient's allergies indicates:   -- Ketorolac     --  Other reaction(s): Rash   -- Cefazolin -- Rash   -- Codeine -- Nausea And Vomiting    --  Other reaction(s): Nausea  Current Outpatient Medications on File Prior to Visit:  b complex vitamins capsule, Take 1 capsule by mouth once daily., Disp: , Rfl:   doxycycline (VIBRA-TABS) 100 MG tablet, Take 1 tablet (100 mg total) by mouth 2 (two) times daily., Disp: 14 tablet, Rfl: 0  naproxen sodium (ANAPROX) 220 MG tablet, Take 220 mg by mouth 2 (two) times daily with meals. Aleve over the counter, Disp: , Rfl:     No current facility-administered medications on file prior to visit.     Social History    Socioeconomic History      Marital status:       Spouse name: Not on file      Number of children: Not on file      Years of education: Not on file      Highest education level: Not on file    Social Needs      Financial resource strain: Not on file      Food insecurity - worry: Not on file      Food insecurity - inability: Not on file      Transportation needs - medical: Not on file      Transportation needs - non-medical: Not on file    Occupational History      Not on file    Tobacco Use      Smoking status: Never Smoker      Smokeless tobacco: Never Used    Substance and Sexual Activity      Alcohol use: Yes        Alcohol/week: 0.6 oz        Types: 1 Glasses of wine per week        Comment: occasional      Drug use: No      Sexual activity: Not  Currently        Partners: Male        Birth control/protection: Post-menopausal    Other Topics      Concerns:        Not on file    Social History Narrative      Not on file    Review of patient's family history indicates:  Problem: Heart disease      Relation: Mother          Age of Onset: (Not Specified)  Problem: Diabetes      Relation: Mother          Age of Onset: (Not Specified)  Problem: Cancer      Relation: Father          Age of Onset: (Not Specified)          Comment: prostat  Problem: Colon cancer      Relation: Brother          Age of Onset: 40  Problem: No Known Problems      Relation: Sister          Age of Onset: (Not Specified)  Problem: No Known Problems      Relation: Maternal Aunt          Age of Onset: (Not Specified)  Problem: No Known Problems      Relation: Maternal Uncle          Age of Onset: (Not Specified)  Problem: No Known Problems      Relation: Paternal Aunt          Age of Onset: (Not Specified)  Problem: No Known Problems      Relation: Paternal Uncle          Age of Onset: (Not Specified)  Problem: No Known Problems      Relation: Maternal Grandmother          Age of Onset: (Not Specified)  Problem: No Known Problems      Relation: Maternal Grandfather          Age of Onset: (Not Specified)  Problem: No Known Problems      Relation: Paternal Grandmother          Age of Onset: (Not Specified)  Problem: No Known Problems      Relation: Paternal Grandfather          Age of Onset: (Not Specified)  Problem: Breast cancer      Relation: Neg Hx          Age of Onset: (Not Specified)  Problem: Ovarian cancer      Relation: Neg Hx          Age of Onset: (Not Specified)  Problem: Amblyopia      Relation: Neg Hx          Age of Onset: (Not Specified)  Problem: Blindness      Relation: Neg Hx          Age of Onset: (Not Specified)  Problem: Cataracts      Relation: Neg Hx          Age of Onset: (Not Specified)  Problem: Glaucoma      Relation: Neg Hx          Age of Onset: (Not  Specified)  Problem: Hypertension      Relation: Neg Hx          Age of Onset: (Not Specified)  Problem: Macular degeneration      Relation: Neg Hx          Age of Onset: (Not Specified)  Problem: Retinal detachment      Relation: Neg Hx          Age of Onset: (Not Specified)  Problem: Strabismus      Relation: Neg Hx          Age of Onset: (Not Specified)  Problem: Stroke      Relation: Neg Hx          Age of Onset: (Not Specified)  Problem: Thyroid disease      Relation: Neg Hx          Age of Onset: (Not Specified)            Review of Systems   Constitutional: Negative for chills and fever.   Respiratory: Negative for cough.    Cardiovascular: Negative.    Gastrointestinal: Positive for abdominal pain and diarrhea.   Genitourinary: Positive for dysuria, frequency and urgency.   Neurological: Positive for weakness.   Psychiatric/Behavioral: Positive for confusion.       Objective:      Physical Exam   Constitutional: She is oriented to person, place, and time. No distress.   HENT:   Head: Normocephalic and atraumatic.   Eyes: Pupils are equal, round, and reactive to light.   Cardiovascular: Normal rate and regular rhythm. Exam reveals no friction rub.   No murmur heard.  Pulmonary/Chest: Effort normal and breath sounds normal. No stridor. No respiratory distress.   Abdominal: Soft. Bowel sounds are normal. She exhibits no distension. There is no tenderness.   Musculoskeletal: She exhibits no edema.   Neurological: She is alert and oriented to person, place, and time.   Skin: She is not diaphoretic. No erythema.   Psychiatric: She has a normal mood and affect. Her behavior is normal.   Vitals reviewed.      Assessment:       1. Painful urination    2. Diarrhea, unspecified type    3. Lower abdominal pain        Plan:       1. Painful urination    - Urinalysis  - Urine culture  - ciprofloxacin HCl (CIPRO) 250 MG tablet; Take 1 tablet (250 mg total) by mouth 2 (two) times daily.  Dispense: 20 tablet; Refill: 0    2.  Diarrhea, unspecified type    - Clostridium difficile EIA  - Stool culture; Future  - Occult blood x 1, stool; Future  - Stool Exam-Ova,Cysts,Parasites; Future  - Giardia / Cryptosporidum, EIA; Future  - CT Abdomen Pelvis With Contrast; Future  - CBC auto differential; Future  - Comprehensive metabolic panel; Future  - Ambulatory referral to Gastroenterology  - hyoscyamine (ANASPAZ,LEVSIN) 0.125 mg Tab; Take 1 tablet (125 mcg total) by mouth every 6 (six) hours as needed (diarrhea).  Dispense: 60 tablet; Refill: 0    3. Lower abdominal pain    - CT Abdomen Pelvis With Contrast; Future  - CBC auto differential; Future  - Comprehensive metabolic panel; Future  - Ambulatory referral to Gastroenterology  - hyoscyamine (ANASPAZ,LEVSIN) 0.125 mg Tab; Take 1 tablet (125 mcg total) by mouth every 6 (six) hours as needed (diarrhea).  Dispense: 60 tablet; Refill: 0

## 2018-11-08 ENCOUNTER — TELEPHONE (OUTPATIENT)
Dept: FAMILY MEDICINE | Facility: CLINIC | Age: 78
End: 2018-11-08

## 2018-11-08 NOTE — TELEPHONE ENCOUNTER
Attempted to call pt left message on voicemail for pt to return call to clinic in regards to result

## 2018-11-08 NOTE — TELEPHONE ENCOUNTER
----- Message from Kareen Flores sent at 11/8/2018 10:44 AM CST -----  Contact: self  Patient [phone] is returning call from this morning/please call

## 2019-05-17 ENCOUNTER — OFFICE VISIT (OUTPATIENT)
Dept: GASTROENTEROLOGY | Facility: CLINIC | Age: 79
End: 2019-05-17
Payer: MEDICARE

## 2019-05-17 VITALS — BODY MASS INDEX: 20.85 KG/M2 | RESPIRATION RATE: 18 BRPM | HEIGHT: 62 IN | WEIGHT: 113.31 LBS

## 2019-05-17 DIAGNOSIS — R63.0 DECREASED APPETITE: ICD-10-CM

## 2019-05-17 DIAGNOSIS — Z87.898 HISTORY OF APHASIA: ICD-10-CM

## 2019-05-17 DIAGNOSIS — R10.9 ABDOMINAL CRAMPING: ICD-10-CM

## 2019-05-17 DIAGNOSIS — K59.00 CONSTIPATION, UNSPECIFIED CONSTIPATION TYPE: Primary | ICD-10-CM

## 2019-05-17 DIAGNOSIS — R10.84 GENERALIZED ABDOMINAL PAIN: ICD-10-CM

## 2019-05-17 DIAGNOSIS — Z80.0 FAMILY HISTORY OF COLON CANCER: ICD-10-CM

## 2019-05-17 DIAGNOSIS — R10.816 EPIGASTRIC ABDOMINAL TENDERNESS WITHOUT REBOUND TENDERNESS: ICD-10-CM

## 2019-05-17 PROCEDURE — 99213 OFFICE O/P EST LOW 20 MIN: CPT | Mod: PBBFAC,PO | Performed by: NURSE PRACTITIONER

## 2019-05-17 PROCEDURE — 99999 PR PBB SHADOW E&M-EST. PATIENT-LVL III: ICD-10-PCS | Mod: PBBFAC,,, | Performed by: NURSE PRACTITIONER

## 2019-05-17 PROCEDURE — 99214 OFFICE O/P EST MOD 30 MIN: CPT | Mod: S$PBB,,, | Performed by: NURSE PRACTITIONER

## 2019-05-17 PROCEDURE — 99999 PR PBB SHADOW E&M-EST. PATIENT-LVL III: CPT | Mod: PBBFAC,,, | Performed by: NURSE PRACTITIONER

## 2019-05-17 PROCEDURE — 99214 PR OFFICE/OUTPT VISIT, EST, LEVL IV, 30-39 MIN: ICD-10-PCS | Mod: S$PBB,,, | Performed by: NURSE PRACTITIONER

## 2019-05-17 RX ORDER — POLYETHYLENE GLYCOL 3350 17 G/17G
17 POWDER, FOR SOLUTION ORAL DAILY
Qty: 510 G | Refills: 2 | Status: SHIPPED | OUTPATIENT
Start: 2019-05-17 | End: 2019-06-16

## 2019-05-17 NOTE — LETTER
May 17, 2019      [place]  [address] [facility] [address]           [facility]  [address] [facility] [address] [place] [address]  Phone: [phone]          Patient: Sascha Martinez   MR Number: [id number]   YOB: 1940   Date of Visit: 5/17/2019       Dear Elsy Muñoz:    Thank you for referring Sascha Martinez to me for evaluation. Attached you will find relevant portions of my assessment and plan of care.    If you have questions, please do not hesitate to call me. I look forward to following Sascha Martinez along with you.    Sincerely,    Alee Gastelum, [facility]    Enclosure  CC:  No Recipients    If you would like to receive this communication electronically, please contact [email or web address] or [phone] to request more information on [email or web address] Link access.    For providers and/or their staff who would like to refer a patient to [facility], please contact us through our one-stop-shop provider referral line, Babs Gibson, at [phone].    If you feel you have received this communication in error or would no longer like to receive these types of communications, please e-mail [email or web address]

## 2019-05-17 NOTE — PATIENT INSTRUCTIONS
Abdominal Pain    Abdominal pain is pain in the stomach or belly area. Everyone has this pain from time to time. In many cases it goes away on its own. But abdominal pain can sometimes be due to a serious problem, such as appendicitis. So its important to know when to seek help.  Causes of abdominal pain  There are many possible causes of abdominal pain. Common causes in adults include:  · Constipation, diarrhea, or gas  · Stomach acid flowing back up into the esophagus (acid reflux or heartburn)  · Severe acid reflux, called GERD (gastroesophageal reflux disease)  · A sore in the lining of the stomach or small intestine (peptic ulcer)  · Inflammation of the gallbladder, liver, or pancreas  · Gallstones or kidney stones  · Appendicitis   · Intestinal blockage   · An internal organ pushing through a muscle or other tissue (hernia)  · Urinary tract infections  · In women, menstrual cramps, fibroids, or endometriosis  · Inflammation or infection of the intestines  Diagnosing the cause of abdominal pain  Your healthcare provider will do a physical exam help find the cause of your pain. If needed, tests will be ordered. Belly pain has many possible causes. So it can be hard to find the reason for your pain. Giving details about your pain can help. Tell your provider where and when you feel the pain, and what makes it better or worse. Also let your provider know if you have other symptoms such as:  · Fever  · Tiredness  · Upset stomach (nausea)  · Vomiting  · Changes in bathroom habits  Treating abdominal pain  Some causes of pain need emergency medical treatment right away. These include appendicitis or a bowel blockage. Other problems can be treated with rest, fluids, or medicines. Your healthcare provider can give you specific instructions for treatment or self-care based on what is causing your pain.  If you have vomiting or diarrhea, sip water or other clear fluids. When you are ready to eat solid foods again,  start with small amounts of easy-to-digest, low-fat foods. These include apple sauce, toast, or crackers.   When to seek medical care  Call 911 or go to the hospital right away if you:  · Cant pass stool and are vomiting  · Are vomiting blood or have bloody diarrhea or black, tarry diarrhea  · Have chest, neck, or shoulder pain  · Feel like you might pass out  · Have pain in your shoulder blades with nausea  · Have sudden, severe belly pain  · Have new, severe pain unlike any you have felt before  · Have a belly that is rigid, hard, and tender to touch  Call your healthcare provider if you have:  · Pain for more than 5 days  · Bloating for more than 2 days  · Diarrhea for more than 5 days  · A fever of 100.4°F (38.0°C) or higher, or as directed by your provider  · Pain that gets worse  · Weight loss for no reason  · Continued lack of appetite  · Blood in your stool  How to prevent abdominal pain  Here are some tips to help prevent abdominal pain:  · Eat smaller amounts of food at one time.  · Avoid greasy, fried, or other high-fat foods.  · Avoid foods that give you gas.  · Exercise regularly.  · Drink plenty of fluids.  To help prevent GERD symptoms:  · Quit smoking.  · Reduce alcohol and certain foods that increase stomach acid.  · Avoid aspirin and over-the-counter pain and fever medicines (NSAIDS or nonsteroidal anti-inflammatory drugs), if possible  · Lose extra weight.  · Finish eating at least 2 hours before you go to bed or lie down.  · Raise the head of your bed.  Date Last Reviewed: 7/1/2016  © 8699-0340 [employer]. [address] [place] [address], [place] [address]. All rights reserved. This information is not intended as a substitute for professional medical care. Always follow your healthcare professional's instructions.        Constipation (Adult)  Constipation means that you have bowel movements that are less frequent than usual. Stools often become very hard and difficult to  pass.  Constipation is very common. At some point in life it affects almost everyone. Since everyone's bowel habits are different, what is constipation to one person may not be to another. Your healthcare provider may do tests to diagnose constipation. It depends on what he or she finds when evaluating you.    Symptoms of constipation include:  · Abdominal pain  · Bloating  · Vomiting  · Painful bowel movements  · Itching, swelling, bleeding, or pain around the anus  Causes  Constipation can have many causes. These include:  · Diet low in fiber  · Too much dairy  · Not drinking enough liquids  · Lack of exercise or physical activity. This is especially true for older adults.  · Changes in lifestyle or daily routine, including pregnancy, aging, work, and travel  · Frequent use or misuse of laxatives  · Ignoring the urge to have a bowel movement or delaying it until later  · Medicines, such as certain prescription pain medicines, iron supplements, antacids, certain antidepressants, and calcium supplements  · Diseases like irritable bowel syndrome, bowel obstructions, stroke, diabetes, thyroid disease, Parkinson disease, hemorrhoids, and colon cancer  Complications  Potential complications of constipation can include:  · Hemorrhoids  · Rectal bleeding from hemorrhoids or anal fissures (skin tears)  · Hernias  · Dependency on laxatives  · Chronic constipation  · Fecal impaction  · Bowel obstruction or perforation  Home care  All treatment should be done after talking with your healthcare provider. This is especially true if you have another medical problems, are taking prescription medicines, or are an older adult. Treatment most often involves lifestyle changes. You may also need medicines. Your healthcare provider will tell you which will work best for you. Follow the advice below to help avoid this problem in the future.  Lifestyle changes  These lifestyle changes can help prevent constipation:  · Diet. Eat a  high-fiber diet, with fresh fruit and vegetables, and reduce dairy intake, meats, and processed foods  · Fluids. It's important to get enough fluids each day. Drink plenty of water when you eat more fiber. If you are on diet that limits the amount of fluid you can have, talk about this with your healthcare provider.  · Regular exercise. Check with your healthcare provider first.  Medications  Take any medicines as directed. Some laxatives are safe to use only every now and then. Others can be taken on a regular basis. Talk with your doctor or pharmacist if you have questions.  Prescription pain medicines can cause constipation. If you are taking this kind of medicine, ask your healthcare provider if you should also take a stool softener.  Medicines you may take to treat constipation include:  · Fiber supplements  · Stool softeners  · Laxatives  · Enemas  · Rectal suppositories  Follow-up care  Follow up with your healthcare provider if symptoms don't get better in the next few days. You may need to have more tests or see a specialist.  Call 911  Call 911 if any of these occur:  · Trouble breathing  · Stiff, rigid abdomen that is severely painful to touch  · Confusion  · Fainting or loss of consciousness  · Rapid heart rate  · Chest pain  When to seek medical advice  Call your healthcare provider right away if any of these occur:  · Fever over 100.4°F (38°C)  · Failure to resume normal bowel movements  · Pain in your abdomen or back gets worse  · Nausea or vomiting  · Swelling in your abdomen  · Blood in the stool  · Black, tarry stool  · Involuntary weight loss  · Weakness  Date Last Reviewed: 12/30/2015  © 0109-1320 [employer]. [address] [place] [address], [place] [address]. All rights reserved. This information is not intended as a substitute for professional medical care. Always follow your healthcare professional's instructions.

## 2019-05-17 NOTE — PROGRESS NOTES
Subjective:       Patient ID: Sascha Martinez is a 79 y.o. female Body mass index is 20.73 kg/m².    Chief Complaint: Abdominal Cramping (diarrhea)    This patient is new to me.  Referring Provider: LIT Muñoz NP for diarrhea & lower abdominal pain.  Established patient of Dr. Cerda (last in 2013).    Patient is here with a family member (daughter), whom assisted with history. Patient is poor historian. Family member reports patient has trouble with speech described as hard to find words.    GI Problem   The primary symptoms include abdominal pain (intermittent abdominal cramping with eating a big meal; not associated with bowel movement). Primary symptoms do not include fever, weight loss, fatigue, nausea, vomiting, diarrhea (resolved; patient reports stool is hard), melena, hematemesis, hematochezia or dysuria. The illness began more than 7 days ago (started ~11/2018 with diarrhea and lower abdominal pain; saw PCP team and completed blood work and x-ray; patient did not complete ct scan or stool studies that were ordered; patient now describes stool as hard and small). The problem has been gradually worsening.   The illness is also significant for constipation (Unable to report how frequent her bowel movements are. Last bowel movement was yesterday.). The illness does not include chills, dysphagia or odynophagia. Significant associated medical issues include hemorrhoids. Associated medical issues do not include inflammatory bowel disease or GERD.     Review of Systems   Constitutional: Positive for appetite change (decreased; afraid to eat due to abdominal cramping; when she gets hungry then she eats a large meal and that's when she gets the abdominal pain). Negative for chills, fatigue, fever and weight loss.   HENT: Negative for sore throat and trouble swallowing.    Respiratory: Negative for cough, choking and shortness of breath.    Cardiovascular: Negative for chest pain.   Gastrointestinal: Positive for  "abdominal pain (intermittent abdominal cramping with eating a big meal; not associated with bowel movement) and constipation (Unable to report how frequent her bowel movements are. Last bowel movement was yesterday.). Negative for anal bleeding, blood in stool, diarrhea (resolved; patient reports stool is hard), dysphagia, hematemesis, hematochezia, melena, nausea, rectal pain and vomiting.   Genitourinary: Negative for difficulty urinating, dysuria and flank pain.   Neurological: Negative for weakness.       No LMP recorded. Patient has had a hysterectomy.  Past Medical History:   Diagnosis Date    Arthritis     right thumb,left elbow    Carpal tunnel syndrome, bilateral     Cataract     OS    DVT (deep venous thrombosis)     after hysterectomy    Endometrial thickening on ultra sound 2013    GERD (gastroesophageal reflux disease)     hiatal hernia    Hyperlipidemia     Memory loss     "hard time finding words"    Migraine headache     Osteoporosis     PVD (posterior vitreous detachment)     OU     Past Surgical History:   Procedure Laterality Date    CATARACT EXTRACTION W/  INTRAOCULAR LENS IMPLANT Right 01/30/2018    [facility]    CATARACT EXTRACTION W/  INTRAOCULAR LENS IMPLANT Left 03/13/2018    [facility]    CHALAZION EXCISION      [facility]    COLONOSCOPY  2009    COLONOSCOPY  05/02/2013    Dr. Cerda, in media: hemorrhoids, otherwise unremarkable; repeat in 5 years for screening    DILATION AND CURETTAGE OF UTERUS  5/2013    complex hyperplasia of the uterus    HYSTERECTOMY  5/28/2013    [facility] for complex hyperplasia    HYSTEROSCOPY, WITH DILATION AND CURETTAGE OF UTERUS N/A 4/16/2013    [facility]    INSERTION-INTRAOCULAR LENS (IOL) Left 3/13/2018    [facility] at [facility] OR    INSERTION-INTRAOCULAR LENS (IOL) Right 1/30/2018    [facility] at [facility] OR    LIPOMA RESECTION  2009    back    NASAL POLYP SURGERY  " 1960's    OOPHORECTOMY      PHACOEMULSIFICATION-ASPIRATION-CATARACT Left 3/13/2018    [facility]    PHACOEMULSIFICATION-ASPIRATION-CATARACT Right 1/30/2018    [facility] OR     Family History   Problem Relation Age of Onset    Heart disease Mother     Diabetes Mother     Cancer Father         prostat    Colon cancer Brother 40    No Known Problems Sister     No Known Problems Maternal Aunt     No Known Problems Maternal Uncle     No Known Problems Paternal Aunt     No Known Problems Paternal Uncle     No Known Problems Maternal Grandmother     No Known Problems Maternal Grandfather     No Known Problems Paternal Grandmother     No Known Problems Paternal Grandfather     Breast cancer Neg Hx     Ovarian cancer Neg Hx     Amblyopia Neg Hx     Blindness Neg Hx     Cataracts Neg Hx     Glaucoma Neg Hx     Hypertension Neg Hx     Macular degeneration Neg Hx     Retinal detachment Neg Hx     Strabismus Neg Hx     Stroke Neg Hx     Thyroid disease Neg Hx     Crohn's disease Neg Hx     Ulcerative colitis Neg Hx     Celiac disease Neg Hx      Wt Readings from Last 10 Encounters:   05/17/19 51.4 kg (113 lb 5.1 oz)   11/06/18 51.1 kg (112 lb 10.5 oz)   08/31/18 51.4 kg (113 lb 5.1 oz)   03/13/18 52.2 kg (115 lb)   02/26/18 52.3 kg (115 lb 4.8 oz)   01/30/18 51.7 kg (114 lb)   01/05/18 52.8 kg (116 lb 6.5 oz)   06/12/17 54.4 kg (119 lb 14.9 oz)   03/29/17 54.9 kg (121 lb 0.5 oz)   02/03/17 55.7 kg (122 lb 12.7 oz)     Lab Results   Component Value Date    WBC 7.41 11/06/2018    HGB 13.0 11/06/2018    HCT 39.4 11/06/2018    MCV 91 11/06/2018     (H) 11/06/2018     CMP  Sodium   Date Value Ref Range Status   11/06/2018 140 136 - 145 mmol/L Final     Potassium   Date Value Ref Range Status   11/06/2018 4.1 3.5 - 5.1 mmol/L Final     Chloride   Date Value Ref Range Status   11/06/2018 107 95 - 110 mmol/L Final     CO2   Date Value  Ref Range Status   11/06/2018 23 23 - 29 mmol/L Final     Glucose   Date Value Ref Range Status   11/06/2018 90 70 - 110 mg/dL Final     BUN, Bld   Date Value Ref Range Status   11/06/2018 12 8 - 23 mg/dL Final     Creatinine   Date Value Ref Range Status   11/06/2018 0.8 0.5 - 1.4 mg/dL Final     Calcium   Date Value Ref Range Status   11/06/2018 9.2 8.7 - 10.5 mg/dL Final     Total Protein   Date Value Ref Range Status   11/06/2018 7.3 6.0 - 8.4 g/dL Final     Albumin   Date Value Ref Range Status   11/06/2018 3.8 3.5 - 5.2 g/dL Final     Total Bilirubin   Date Value Ref Range Status   11/06/2018 0.4 0.1 - 1.0 mg/dL Final     Comment:     For infants and newborns, interpretation of results should be based  on gestational age, weight and in agreement with clinical  observations.  Premature Infant recommended reference ranges:  Up to 24 hours.............<8.0 mg/dL  Up to 48 hours............<12.0 mg/dL  3-5 days..................<15.0 mg/dL  6-29 days.................<15.0 mg/dL       Alkaline Phosphatase   Date Value Ref Range Status   11/06/2018 81 55 - 135 U/L Final     AST   Date Value Ref Range Status   11/06/2018 17 10 - 40 U/L Final     ALT   Date Value Ref Range Status   11/06/2018 9 (L) 10 - 44 U/L Final     Anion Gap   Date Value Ref Range Status   11/06/2018 10 8 - 16 mmol/L Final     eGFR if    Date Value Ref Range Status   11/06/2018 >60.0 >60 mL/min/1.73 m^2 Final     eGFR if non    Date Value Ref Range Status   11/06/2018 >60.0 >60 mL/min/1.73 m^2 Final     Comment:     Calculation used to obtain the estimated glomerular filtration  rate (eGFR) is the CKD-EPI equation.        Lab Results   Component Value Date    TSH 3.624 01/08/2018     Reviewed prior medical records including radiology report of 11/18/16 abdominal x-ray; 4/9/13 CT abdomen pelvis; & endoscopy history (see surgical history).    Objective:      Physical Exam   Constitutional: She is oriented to person,  place, and time. She appears well-developed and well-nourished. No distress.   HENT:   Mouth/Throat: Oropharynx is clear and moist and mucous membranes are normal. No oral lesions. No oropharyngeal exudate.   Eyes: Pupils are equal, round, and reactive to light. Conjunctivae are normal. No scleral icterus.   Pulmonary/Chest: Effort normal and breath sounds normal. No respiratory distress. She has no wheezes.   Abdominal: Soft. Normal appearance and bowel sounds are normal. She exhibits no distension, no abdominal bruit and no mass. There is tenderness (mild) in the epigastric area. There is no rigidity, no rebound, no guarding, no tenderness at McBurney's point and negative Wilks's sign.   Neurological: She is alert and oriented to person, place, and time.   Skin: Skin is warm and dry. No rash noted. She is not diaphoretic. No erythema. No pallor.   Non-jaundiced   Psychiatric: She has a normal mood and affect. Her behavior is normal. Judgment and thought content normal. Her speech is delayed.   Nursing note and vitals reviewed.      Assessment:       1. Constipation, unspecified constipation type    2. Abdominal cramping    3. Family history of colon cancer    4. Epigastric abdominal tenderness without rebound tenderness    5. Generalized abdominal pain    6. Decreased appetite    7. History of aphasia        Plan:       Constipation, unspecified constipation type  -  START   polyethylene glycol (GLYCOLAX) 17 gram/dose powder; Take 17 g by mouth once daily.  Dispense: 510 g; Refill: 2  - COMPLETE CT SCAN AS ORDERED BY PCP; I CC'D THE TEST RESULTS TO MYSELF.  Recommend daily exercise as tolerated, adequate water intake (six 8-oz glasses of water daily), and high fiber diet. OTC fiber supplements are recommended if diet does not reach daily fiber goal (20-30 grams daily), such as Metamucil, Citrucel, or FiberCon (take as directed, separate from other oral medications by >2 hours).  -Recommend taking an OTC stool  softener such as Colace as directed to avoid hard stools and straining with bowel movements PRN  -If still no improvement with these measures, call/follow-up    Abdominal cramping, Generalized abdominal pain, & Epigastric abdominal tenderness without rebound tenderness  - COMPLETE CT SCAN AS ORDERED BY PCP; I CC'D THE TEST RESULTS TO MYSELF.  - schedule Colonoscopy to be done in 2-4 weeks, discussed procedure with the patient, patient verbalized understanding  - Possible EGD pending results of testing and if symptoms persist    Family history of colon cancer  - schedule Colonoscopy to be done in 2-4 weeks, discussed procedure with the patient, patient verbalized understanding    Decreased appetite  - encouraged PO intake and daily calorie counts to ensure adequate nutrition is taken in, recommend at least 1,800-2,000 calories a day  - recommend nutritional drinks, such as Boost, Ensure or Glucerna, to supplement nutrition needs  - Possible EGD pending results of testing and if symptoms persist    History of aphasia  Recommend follow-up with Primary Care Provider & neurology for continued evaluation and management.    Follow up in about 1 month (around 6/17/2019), or if symptoms worsen or fail to improve.      If no improvement in symptoms or symptoms worsen, call/follow-up at clinic or go to ER.

## 2019-05-24 DIAGNOSIS — R19.7 DIARRHEA, UNSPECIFIED: Primary | ICD-10-CM

## 2019-05-24 DIAGNOSIS — R10.30 LOWER ABDOMINAL PAIN: ICD-10-CM

## 2019-05-27 ENCOUNTER — HOSPITAL ENCOUNTER (OUTPATIENT)
Dept: RADIOLOGY | Facility: HOSPITAL | Age: 79
Discharge: HOME | End: 2019-05-27
Attending: NURSE PRACTITIONER
Payer: MEDICARE

## 2019-05-27 ENCOUNTER — TELEPHONE (OUTPATIENT)
Dept: GASTROENTEROLOGY | Facility: CLINIC | Age: 79
End: 2019-05-27

## 2019-05-27 DIAGNOSIS — K76.9 LIVER LESION: Primary | ICD-10-CM

## 2019-05-27 DIAGNOSIS — R19.7 DIARRHEA, UNSPECIFIED TYPE: ICD-10-CM

## 2019-05-27 DIAGNOSIS — R10.30 LOWER ABDOMINAL PAIN: ICD-10-CM

## 2019-05-27 PROCEDURE — 25500020 PHARM REV CODE 255: Mod: PO | Performed by: NURSE PRACTITIONER

## 2019-05-27 PROCEDURE — 74177 CT ABDOMEN PELVIS WITH CONTRAST: ICD-10-PCS | Mod: 26,,, | Performed by: RADIOLOGY

## 2019-05-27 PROCEDURE — 74177 CT ABD & PELVIS W/CONTRAST: CPT | Mod: TC,PO

## 2019-05-27 PROCEDURE — 74177 CT ABD & PELVIS W/CONTRAST: CPT | Mod: 26,,, | Performed by: RADIOLOGY

## 2019-05-27 RX ADMIN — IOHEXOL 75 ML: 350 INJECTION, SOLUTION INTRAVENOUS at 10:05

## 2019-05-27 RX ADMIN — IOHEXOL 30 ML: 350 INJECTION, SOLUTION INTRAVENOUS at 10:05

## 2019-05-27 NOTE — PATIENT INSTRUCTIONS

## 2019-05-27 NOTE — TELEPHONE ENCOUNTER
"Please call to inform & review the results with the patient- radiology report of the ct abdomen pelvis showed a liver lesion which is nonspecific. Radiologist reports possible cyst (cyst are typically benign). Need Ultrasound to further evaluate (ordered).  Other findings showed "No obvious source" for patient's symptoms & diverticulosis noted without signs of diverticulitis (no infection or inflammation). Recommend high fiber diet (20-30 grams of fiber daily)/OTC fiber supplements daily as directed.  Gas noted in esophagus which can relate to air swallowing or GERD. Recommend EGD to further evaluate this finding and follow-up GERD dietary and lifestyle modifications (attached).  Otherwise, unremarkable findings.    Continue with previous recommendations. If no improvement in symptoms or symptoms worsen, call/follow-up at clinic or go to ER.  Please release results to patient's mychart once you have discussed results and recommendations with patient.  Thanks,        "

## 2019-05-28 ENCOUNTER — PATIENT MESSAGE (OUTPATIENT)
Dept: GASTROENTEROLOGY | Facility: CLINIC | Age: 79
End: 2019-05-28

## 2019-05-28 NOTE — TELEPHONE ENCOUNTER
Spoke to pt's  per pt request regarding ct recommendations, states he will discuss with his dghtr & ask that he be called back tomorrow for his decision.

## 2019-05-29 NOTE — TELEPHONE ENCOUNTER
Pt daughter spoke with Gianna Cai LPN and were scheduled for testing recommended per Brenna Gastelum NP.

## 2019-06-04 ENCOUNTER — HOSPITAL ENCOUNTER (OUTPATIENT)
Dept: RADIOLOGY | Facility: HOSPITAL | Age: 79
Discharge: HOME | End: 2019-06-04
Attending: NURSE PRACTITIONER
Payer: MEDICARE

## 2019-06-04 DIAGNOSIS — K76.9 LIVER LESION: ICD-10-CM

## 2019-06-04 PROCEDURE — 76705 ECHO EXAM OF ABDOMEN: CPT | Mod: TC,PO

## 2019-06-04 PROCEDURE — 76705 US ABDOMEN LIMITED: ICD-10-PCS | Mod: 26,,, | Performed by: RADIOLOGY

## 2019-06-04 PROCEDURE — 76705 ECHO EXAM OF ABDOMEN: CPT | Mod: 26,,, | Performed by: RADIOLOGY

## 2019-06-05 ENCOUNTER — TELEPHONE (OUTPATIENT)
Dept: GASTROENTEROLOGY | Facility: CLINIC | Age: 79
End: 2019-06-05

## 2019-06-05 DIAGNOSIS — K82.4 GALLBLADDER POLYP: Primary | ICD-10-CM

## 2019-06-05 NOTE — TELEPHONE ENCOUNTER
"Please call to inform & review the results with the patient- radiology report of the RUQ abdominal ultrasound showed "1.  There is a 10 mm cyst in the medial left lobe of the liver corresponding to hypodensity on CT." Cyst are typically benign and can be monitored by PCP.    "There is a 3 mm polyp within the gallbladder which is otherwise normal":   - recommend low fat diet  - recommend continue recommendations as discussed during our visit and if symptoms persist after other testing has been completed, recommend seeing general surgery for further evaluation and management (referral placed).    Continue with previous recommendations. If no improvement in symptoms or symptoms worsen, call/follow-up at clinic or go to ER.  Please release results to patient's mychart once you have discussed results and recommendations with patient.  Thanks,        "

## 2019-06-05 NOTE — TELEPHONE ENCOUNTER
Spoke with pt  and discussed results and recommendations per Brenna Gastelum NP. Pt  verbalized understanding.

## 2019-06-06 ENCOUNTER — OFFICE VISIT (OUTPATIENT)
Dept: SURGERY | Facility: CLINIC | Age: 79
End: 2019-06-06
Payer: MEDICARE

## 2019-06-06 VITALS
HEIGHT: 62 IN | TEMPERATURE: 97 F | HEART RATE: 71 BPM | WEIGHT: 113.75 LBS | SYSTOLIC BLOOD PRESSURE: 183 MMHG | DIASTOLIC BLOOD PRESSURE: 82 MMHG | BODY MASS INDEX: 20.93 KG/M2

## 2019-06-06 DIAGNOSIS — K82.4 GALLBLADDER POLYP: Primary | ICD-10-CM

## 2019-06-06 PROCEDURE — 99203 OFFICE O/P NEW LOW 30 MIN: CPT | Mod: S$PBB,,, | Performed by: SURGERY

## 2019-06-06 PROCEDURE — 99213 OFFICE O/P EST LOW 20 MIN: CPT | Mod: PBBFAC,PO | Performed by: SURGERY

## 2019-06-06 PROCEDURE — 99999 PR PBB SHADOW E&M-EST. PATIENT-LVL III: ICD-10-PCS | Mod: PBBFAC,,, | Performed by: SURGERY

## 2019-06-06 PROCEDURE — 99203 PR OFFICE/OUTPT VISIT, NEW, LEVL III, 30-44 MIN: ICD-10-PCS | Mod: S$PBB,,, | Performed by: SURGERY

## 2019-06-06 PROCEDURE — 99999 PR PBB SHADOW E&M-EST. PATIENT-LVL III: CPT | Mod: PBBFAC,,, | Performed by: SURGERY

## 2019-06-06 NOTE — LETTER
June 7, 2019      Alee Gastelum, DADA  [address] [facility] [address] [place] [address]           [facility]  [address] [facility] [address] [place] [address]  Phone: [phone]          Patient: Sascha Martinez   MR Number: [id number]   YOB: 1940   Date of Visit: 6/6/2019       Dear Alee Gastelum:    Thank you for referring Sascha Martinez to me for evaluation. Attached you will find relevant portions of my assessment and plan of care.    If you have questions, please do not hesitate to call me. I look forward to following Sascha Martinez along with you.    Sincerely,    Tj Joseph MD    Enclosure  CC:  No Recipients    If you would like to receive this communication electronically, please contact [email or web address] or [phone] to request more information on [employer] access.    For providers and/or their staff who would like to refer a patient to [facility], please contact us through our one-stop-shop provider referral line, Babs Gibson, at [phone].    If you feel you have received this communication in error or would no longer like to receive these types of communications, please e-mail [email or web address]

## 2019-06-07 NOTE — PROGRESS NOTES
Subjective:       Patient ID: Sascha Martinez is a 79 y.o. female.    Chief Complaint: Consult (Gallbladder polyp)    HPI   Pleasant 80 yo F referred to me in consultation for evaluation gallbladder polyp.  Pt had been having some epigastric pain and for this she had a ct scan.  On ct scan a hepatic cyst was seen and an US was ordered to evaluate the quality of the cyst.  On US a cystic lesion was seen in the liver and within the gallblader a small 3 mm polyp was seen.  No gallstones.  No n/v.  No fever/chills. Pt notes that she is otherwise without complaint  Review of Systems   Constitutional: Negative for activity change, appetite change, fever and unexpected weight change.   Respiratory: Negative for chest tightness, shortness of breath and wheezing.    Cardiovascular: Negative for chest pain.   Gastrointestinal: Negative for abdominal distention, abdominal pain, anal bleeding, blood in stool, constipation, diarrhea, nausea, rectal pain and vomiting.   Genitourinary: Negative for difficulty urinating, dysuria and frequency.   Skin: Negative for color change and wound.   Neurological: Negative for dizziness.   Hematological: Negative for adenopathy.   Psychiatric/Behavioral: Negative for agitation.       Objective:      Physical Exam   Constitutional: She is oriented to person, place, and time. She appears well-developed and well-nourished.   HENT:   Head: Normocephalic and atraumatic.   Eyes: Pupils are equal, round, and reactive to light.   Neck: Normal range of motion. Neck supple. No tracheal deviation present. No thyromegaly present.   Cardiovascular: Normal rate, regular rhythm and normal heart sounds.   No murmur heard.  Pulmonary/Chest: Effort normal and breath sounds normal. She exhibits no tenderness.   Abdominal: Soft. Bowel sounds are normal. She exhibits no distension, no abdominal bruit, no pulsatile midline mass and no mass. There is no hepatosplenomegaly. There is no tenderness. There is no  rigidity, no rebound, no guarding, no tenderness at McBurney's point and negative Wilks's sign. No hernia. Hernia confirmed negative in the ventral area.   Genitourinary: Rectum normal.   Musculoskeletal: Normal range of motion.   Neurological: She is alert and oriented to person, place, and time.   Skin: Skin is warm. No rash noted. No erythema.   Psychiatric: She has a normal mood and affect. Her behavior is normal.   Vitals reviewed.        US:    Liver: The liver is normal in size measuring 13 cm in greatest longitudinal dimension with normal echotexture.  There is a 9 x 10 x 10 mm cyst medially in the left lobe corresponding to the hypodensity seen on CT.  The hepato renal index is 1.2.  There is no focal solid mass.  There is normal directional flow in the main portal vein.  There is no intrahepatic biliary ductal dilatation.    Gallbladder: There is a 3 mm polyp within the gallbladder.  There are no mobile gallstones.  Gallbladder wall thickness of 0.9 mm is normal.  There is a negative sonographic Wilks sign.  There is no biliary ductal dilatation.  The common bile duct measures 4.8 mm.  Assessment:     Gallbladder polyp  No diagnosis found.    Plan:       D/w pt. She is asymptomatic at present and without complaint.  Recommend observation.  F/u with me prn

## 2019-06-10 ENCOUNTER — TELEPHONE (OUTPATIENT)
Dept: GASTROENTEROLOGY | Facility: CLINIC | Age: 79
End: 2019-06-10

## 2019-06-10 NOTE — TELEPHONE ENCOUNTER
----- Message from Reena Longo sent at 6/10/2019  8:26 AM CDT -----  Contact: self   Patient need to cancel upcoming colonoscopy, he will call back to reschedule.

## 2019-10-07 ENCOUNTER — IMMUNIZATION (OUTPATIENT)
Dept: FAMILY MEDICINE | Facility: CLINIC | Age: 79
End: 2019-10-07
Payer: MEDICARE

## 2019-10-07 PROCEDURE — 90662 IIV NO PRSV INCREASED AG IM: CPT | Mod: PBBFAC,PO

## 2019-10-11 ENCOUNTER — OFFICE VISIT (OUTPATIENT)
Dept: FAMILY MEDICINE | Facility: CLINIC | Age: 79
End: 2019-10-11
Payer: MEDICARE

## 2019-10-11 VITALS
WEIGHT: 108.69 LBS | TEMPERATURE: 98 F | HEIGHT: 62 IN | OXYGEN SATURATION: 99 % | BODY MASS INDEX: 20 KG/M2 | SYSTOLIC BLOOD PRESSURE: 124 MMHG | HEART RATE: 65 BPM | DIASTOLIC BLOOD PRESSURE: 70 MMHG

## 2019-10-11 DIAGNOSIS — R19.7 DIARRHEA, UNSPECIFIED TYPE: Primary | ICD-10-CM

## 2019-10-11 DIAGNOSIS — R82.90 ABNORMAL FINDING IN URINE: ICD-10-CM

## 2019-10-11 DIAGNOSIS — R10.9 ABDOMINAL CRAMPING: ICD-10-CM

## 2019-10-11 DIAGNOSIS — R41.3 MEMORY LOSS OR IMPAIRMENT: ICD-10-CM

## 2019-10-11 LAB
BILIRUB UR QL STRIP: NEGATIVE
CLARITY UR: CLEAR
COLOR UR: YELLOW
GLUCOSE UR QL STRIP: NEGATIVE
HGB UR QL STRIP: NEGATIVE
KETONES UR QL STRIP: NEGATIVE
LEUKOCYTE ESTERASE UR QL STRIP: NEGATIVE
NITRITE UR QL STRIP: NEGATIVE
PH UR STRIP: 6 [PH] (ref 5–8)
PROT UR QL STRIP: NEGATIVE
SP GR UR STRIP: >=1.03 (ref 1–1.03)
URN SPEC COLLECT METH UR: ABNORMAL

## 2019-10-11 PROCEDURE — 81003 URINALYSIS AUTO W/O SCOPE: CPT | Mod: PO

## 2019-10-11 PROCEDURE — 99999 PR PBB SHADOW E&M-EST. PATIENT-LVL IV: CPT | Mod: PBBFAC,,, | Performed by: NURSE PRACTITIONER

## 2019-10-11 PROCEDURE — 99214 PR OFFICE/OUTPT VISIT, EST, LEVL IV, 30-39 MIN: ICD-10-PCS | Mod: S$PBB,,, | Performed by: NURSE PRACTITIONER

## 2019-10-11 PROCEDURE — 99214 OFFICE O/P EST MOD 30 MIN: CPT | Mod: PBBFAC,PO | Performed by: NURSE PRACTITIONER

## 2019-10-11 PROCEDURE — 99214 OFFICE O/P EST MOD 30 MIN: CPT | Mod: S$PBB,,, | Performed by: NURSE PRACTITIONER

## 2019-10-11 PROCEDURE — 99999 PR PBB SHADOW E&M-EST. PATIENT-LVL IV: ICD-10-PCS | Mod: PBBFAC,,, | Performed by: NURSE PRACTITIONER

## 2019-10-11 RX ORDER — DICYCLOMINE HYDROCHLORIDE 10 MG/1
10 CAPSULE ORAL
Qty: 120 CAPSULE | Refills: 0 | Status: SHIPPED | OUTPATIENT
Start: 2019-10-11 | End: 2019-11-03 | Stop reason: SDUPTHER

## 2019-10-11 NOTE — PROGRESS NOTES
Subjective:       Patient ID: Sascha Martinez is a 79 y.o. female.    Chief Complaint: Abdominal Cramping (with loose stools)    Patient who is new to me presents for abdominal cramping that occurs after eating. This has been occurring for months. She has loose stools and periodically episodes of incontinence. She has seen multiple providers and has done tests that have not revealed a cause. She drinks only orange juice and cokes, very little water. She has difficulty remembering so her  is the historian. She has not seen a neurologist regarding the memory loss and has no interest in doing so at this time.     Abdominal Cramping   This is a new problem. The current episode started more than 1 month ago (several months). The problem occurs daily (not daily but almost daily. ). The problem has been waxing and waning. The pain is located in the LLQ and RLQ. The abdominal pain does not radiate. Associated symptoms include diarrhea. Pertinent negatives include no anorexia, arthralgias, belching, constipation, dysuria, fever, flatus, frequency, headaches, hematochezia, hematuria, melena, myalgias, nausea or vomiting. The pain is aggravated by eating. The pain is relieved by nothing. She has tried nothing for the symptoms. The treatment provided mild relief. Prior diagnostic workup includes CT scan. Patient's medical history includes UTI.     Review of Systems   Constitutional: Negative for chills, fatigue and fever.   HENT: Negative for congestion, sinus pressure, sinus pain, sneezing and sore throat.    Respiratory: Negative for cough, chest tightness, shortness of breath and wheezing.    Cardiovascular: Negative for chest pain, palpitations and leg swelling.   Gastrointestinal: Positive for abdominal pain and diarrhea. Negative for abdominal distention, anorexia, constipation, flatus, hematochezia, melena, nausea and vomiting.   Genitourinary: Negative for decreased urine volume, difficulty urinating, dysuria,  frequency, hematuria and urgency.   Musculoskeletal: Negative for arthralgias, gait problem, joint swelling and myalgias.   Skin: Negative for rash and wound.   Neurological: Negative for dizziness, light-headedness, numbness and headaches.   Psychiatric/Behavioral: Positive for confusion (chronic).       Objective:      Physical Exam   Constitutional: She is oriented to person, place, and time. She appears well-developed and well-nourished.   HENT:   Head: Normocephalic and atraumatic.   Right Ear: External ear normal.   Left Ear: External ear normal.   Nose: Nose normal.   Mouth/Throat: Oropharynx is clear and moist.   Eyes: Pupils are equal, round, and reactive to light.   Neck: Normal range of motion.   Cardiovascular: Normal rate, regular rhythm, normal heart sounds and intact distal pulses.   Pulmonary/Chest: Effort normal and breath sounds normal.   Abdominal: Soft. Bowel sounds are increased. There is no tenderness. There is no CVA tenderness.   Musculoskeletal: Normal range of motion.   Neurological: She is alert and oriented to person, place, and time.   Skin: Skin is warm and dry.   Psychiatric: Her mood appears anxious. Cognition and memory are impaired.   Nursing note and vitals reviewed.      Assessment:       1. Diarrhea, unspecified type    2. Abdominal cramping    3. Memory loss or impairment    4. Abnormal finding in urine        Plan:       Sascha was seen today for abdominal cramping.    Diagnoses and all orders for this visit:    Diarrhea, unspecified type  -     Comprehensive metabolic panel; Future  -     CBC auto differential; Future  -     Urinalysis  -     CLOSTRIDIUM DIFFICILE; Future  -     Giardia / Cryptosporidum, EIA; Future  -     Stool Exam-Ova,Cysts,Parasites; Future  -     WBC, Stool; Future  -     Ambulatory referral to Gastroenterology  -     Urine culture; Future    Abdominal cramping  -     dicyclomine (BENTYL) 10 MG capsule; Take 1 capsule (10 mg total) by mouth before meals  and at bedtime as needed.  -     Ambulatory referral to Gastroenterology    Memory loss or impairment  Discussed the benefits of neurology referral.    Abnormal finding in urine  -     Urine culture; Future    Advised to avoid fructose (orange juice and soda) and lactose as this can be causing loose stools with episodes of incontinence. Increase water and fiber in diet.   Start probiotic.  Follow up with GI

## 2019-10-17 ENCOUNTER — PES CALL (OUTPATIENT)
Dept: ADMINISTRATIVE | Facility: CLINIC | Age: 79
End: 2019-10-17

## 2019-10-24 ENCOUNTER — TELEPHONE (OUTPATIENT)
Dept: NEUROLOGY | Facility: CLINIC | Age: 79
End: 2019-10-24

## 2019-10-24 NOTE — TELEPHONE ENCOUNTER
Spoke with pt daughter Nena and informed her of no available appointments until January time frame. Pt added to wait list. Will call to schedule when January opens. Verbalized understanding.

## 2019-11-03 DIAGNOSIS — R10.9 ABDOMINAL CRAMPING: ICD-10-CM

## 2019-11-04 RX ORDER — DICYCLOMINE HYDROCHLORIDE 10 MG/1
10 CAPSULE ORAL
Qty: 120 CAPSULE | Refills: 0 | Status: SHIPPED | OUTPATIENT
Start: 2019-11-04 | End: 2019-12-04

## 2019-12-02 ENCOUNTER — OFFICE VISIT (OUTPATIENT)
Dept: GASTROENTEROLOGY | Facility: CLINIC | Age: 79
End: 2019-12-02
Payer: MEDICARE

## 2019-12-02 DIAGNOSIS — K59.00 CONSTIPATION, UNSPECIFIED CONSTIPATION TYPE: Primary | ICD-10-CM

## 2019-12-02 DIAGNOSIS — Z80.0 FAMILY HISTORY OF COLON CANCER: ICD-10-CM

## 2019-12-02 DIAGNOSIS — R10.9 ABDOMINAL CRAMPING: ICD-10-CM

## 2019-12-02 DIAGNOSIS — K82.4 GALLBLADDER POLYP: ICD-10-CM

## 2019-12-02 DIAGNOSIS — Z87.898 HISTORY OF APHASIA: ICD-10-CM

## 2019-12-02 PROCEDURE — 99999 PR PBB SHADOW E&M-EST. PATIENT-LVL III: ICD-10-PCS | Mod: PBBFAC,,, | Performed by: NURSE PRACTITIONER

## 2019-12-02 PROCEDURE — 1125F PR PAIN SEVERITY QUANTIFIED, PAIN PRESENT: ICD-10-PCS | Mod: ,,, | Performed by: NURSE PRACTITIONER

## 2019-12-02 PROCEDURE — 1159F MED LIST DOCD IN RCRD: CPT | Mod: ,,, | Performed by: NURSE PRACTITIONER

## 2019-12-02 PROCEDURE — 99214 OFFICE O/P EST MOD 30 MIN: CPT | Mod: S$PBB,,, | Performed by: NURSE PRACTITIONER

## 2019-12-02 PROCEDURE — 1159F PR MEDICATION LIST DOCUMENTED IN MEDICAL RECORD: ICD-10-PCS | Mod: ,,, | Performed by: NURSE PRACTITIONER

## 2019-12-02 PROCEDURE — 99999 PR PBB SHADOW E&M-EST. PATIENT-LVL III: CPT | Mod: PBBFAC,,, | Performed by: NURSE PRACTITIONER

## 2019-12-02 PROCEDURE — 1125F AMNT PAIN NOTED PAIN PRSNT: CPT | Mod: ,,, | Performed by: NURSE PRACTITIONER

## 2019-12-02 PROCEDURE — 99214 PR OFFICE/OUTPT VISIT, EST, LEVL IV, 30-39 MIN: ICD-10-PCS | Mod: S$PBB,,, | Performed by: NURSE PRACTITIONER

## 2019-12-02 PROCEDURE — 99213 OFFICE O/P EST LOW 20 MIN: CPT | Mod: PBBFAC,PO | Performed by: NURSE PRACTITIONER

## 2019-12-02 RX ORDER — LACTULOSE 10 G/15ML
10 SOLUTION ORAL DAILY
Qty: 450 ML | Refills: 0 | Status: SHIPPED | OUTPATIENT
Start: 2019-12-02 | End: 2019-12-24 | Stop reason: CLARIF

## 2019-12-02 NOTE — PATIENT INSTRUCTIONS
Constipation (Adult)  Constipation means that you have bowel movements that are less frequent than usual. Stools often become very hard and difficult to pass.  Constipation is very common. At some point in life it affects almost everyone. Since everyone's bowel habits are different, what is constipation to one person may not be to another. Your healthcare provider may do tests to diagnose constipation. It depends on what he or she finds when evaluating you.    Symptoms of constipation include:  · Abdominal pain  · Bloating  · Vomiting  · Painful bowel movements  · Itching, swelling, bleeding, or pain around the anus  Causes  Constipation can have many causes. These include:  · Diet low in fiber  · Too much dairy  · Not drinking enough liquids  · Lack of exercise or physical activity. This is especially true for older adults.  · Changes in lifestyle or daily routine, including pregnancy, aging, work, and travel  · Frequent use or misuse of laxatives  · Ignoring the urge to have a bowel movement or delaying it until later  · Medicines, such as certain prescription pain medicines, iron supplements, antacids, certain antidepressants, and calcium supplements  · Diseases like irritable bowel syndrome, bowel obstructions, stroke, diabetes, thyroid disease, Parkinson disease, hemorrhoids, and colon cancer  Complications  Potential complications of constipation can include:  · Hemorrhoids  · Rectal bleeding from hemorrhoids or anal fissures (skin tears)  · Hernias  · Dependency on laxatives  · Chronic constipation  · Fecal impaction  · Bowel obstruction or perforation  Home care  All treatment should be done after talking with your healthcare provider. This is especially true if you have another medical problems, are taking prescription medicines, or are an older adult. Treatment most often involves lifestyle changes. You may also need medicines. Your healthcare provider will tell you which will work best for you. Follow the  advice below to help avoid this problem in the future.  Lifestyle changes  These lifestyle changes can help prevent constipation:  · Diet. Eat a high-fiber diet, with fresh fruit and vegetables, and reduce dairy intake, meats, and processed foods  · Fluids. It's important to get enough fluids each day. Drink plenty of water when you eat more fiber. If you are on diet that limits the amount of fluid you can have, talk about this with your healthcare provider.  · Regular exercise. Check with your healthcare provider first.  Medications  Take any medicines as directed. Some laxatives are safe to use only every now and then. Others can be taken on a regular basis. Talk with your doctor or pharmacist if you have questions.  Prescription pain medicines can cause constipation. If you are taking this kind of medicine, ask your healthcare provider if you should also take a stool softener.  Medicines you may take to treat constipation include:  · Fiber supplements  · Stool softeners  · Laxatives  · Enemas  · Rectal suppositories  Follow-up care  Follow up with your healthcare provider if symptoms don't get better in the next few days. You may need to have more tests or see a specialist.  Call 911  Call 911 if any of these occur:  · Trouble breathing  · Stiff, rigid abdomen that is severely painful to touch  · Confusion  · Fainting or loss of consciousness  · Rapid heart rate  · Chest pain  When to seek medical advice  Call your healthcare provider right away if any of these occur:  · Fever over 100.4°F (38°C)  · Failure to resume normal bowel movements  · Pain in your abdomen or back gets worse  · Nausea or vomiting  · Swelling in your abdomen  · Blood in the stool  · Black, tarry stool  · Involuntary weight loss  · Weakness  Date Last Reviewed: 12/30/2015  © 9041-9178 [employer]. [address] [place] [address], [place] [address]. All rights reserved. This information is not intended as a substitute for professional  medical care. Always follow your healthcare professional's instructions.    Abdominal Pain    Abdominal pain is pain in the stomach or belly area. Everyone has this pain from time to time. In many cases it goes away on its own. But abdominal pain can sometimes be due to a serious problem, such as appendicitis. So its important to know when to seek help.  Causes of abdominal pain  There are many possible causes of abdominal pain. Common causes in adults include:  · Constipation, diarrhea, or gas  · Stomach acid flowing back up into the esophagus (acid reflux or heartburn)  · Severe acid reflux, called GERD (gastroesophageal reflux disease)  · A sore in the lining of the stomach or small intestine (peptic ulcer)  · Inflammation of the gallbladder, liver, or pancreas  · Gallstones or kidney stones  · Appendicitis   · Intestinal blockage   · An internal organ pushing through a muscle or other tissue (hernia)  · Urinary tract infections  · In women, menstrual cramps, fibroids, or endometriosis  · Inflammation or infection of the intestines  Diagnosing the cause of abdominal pain  Your healthcare provider will do a physical exam help find the cause of your pain. If needed, tests will be ordered. Belly pain has many possible causes. So it can be hard to find the reason for your pain. Giving details about your pain can help. Tell your provider where and when you feel the pain, and what makes it better or worse. Also let your provider know if you have other symptoms such as:  · Fever  · Tiredness  · Upset stomach (nausea)  · Vomiting  · Changes in bathroom habits  Treating abdominal pain  Some causes of pain need emergency medical treatment right away. These include appendicitis or a bowel blockage. Other problems can be treated with rest, fluids, or medicines. Your healthcare provider can give you specific instructions for treatment or self-care based on what is causing your pain.  If you have vomiting or diarrhea, sip  water or other clear fluids. When you are ready to eat solid foods again, start with small amounts of easy-to-digest, low-fat foods. These include apple sauce, toast, or crackers.   When to seek medical care  Call 911 or go to the hospital right away if you:  · Cant pass stool and are vomiting  · Are vomiting blood or have bloody diarrhea or black, tarry diarrhea  · Have chest, neck, or shoulder pain  · Feel like you might pass out  · Have pain in your shoulder blades with nausea  · Have sudden, severe belly pain  · Have new, severe pain unlike any you have felt before  · Have a belly that is rigid, hard, and tender to touch  Call your healthcare provider if you have:  · Pain for more than 5 days  · Bloating for more than 2 days  · Diarrhea for more than 5 days  · A fever of 100.4°F (38.0°C) or higher, or as directed by your provider  · Pain that gets worse  · Weight loss for no reason  · Continued lack of appetite  · Blood in your stool  How to prevent abdominal pain  Here are some tips to help prevent abdominal pain:  · Eat smaller amounts of food at one time.  · Avoid greasy, fried, or other high-fat foods.  · Avoid foods that give you gas.  · Exercise regularly.  · Drink plenty of fluids.  To help prevent GERD symptoms:  · Quit smoking.  · Reduce alcohol and certain foods that increase stomach acid.  · Avoid aspirin and over-the-counter pain and fever medicines (NSAIDS or nonsteroidal anti-inflammatory drugs), if possible  · Lose extra weight.  · Finish eating at least 2 hours before you go to bed or lie down.  · Raise the head of your bed.  Date Last Reviewed: 7/1/2016  © 3976-5471 [employer]. [address] [place] [address], [place] [address]. All rights reserved. This information is not intended as a substitute for professional medical care. Always follow your healthcare professional's instructions.

## 2019-12-02 NOTE — PROGRESS NOTES
Subjective:       Patient ID: Sascha Martinez is a 79 y.o. female Body mass index is 20.2 kg/m².    Chief Complaint: GI Problem (Constipation, Abdominal Cramping, & Abdominal Pain)    Established patient of Dr. Cerda & myself.    Patient is here with a family member (), whom assisted with history. Patient is a poor historian. Family member reports patient has trouble with speech described as hard to find words. Patient and  reports they canceled EGD and colonoscopy because her symptoms had gotten better.    GI Problem   The primary symptoms include abdominal pain (intermittent abdominal cramping with eating a big meal; rated 6/10 when it occurs to abdomen and mouth). Primary symptoms do not include fever, weight loss, fatigue, nausea, vomiting, diarrhea, melena, hematemesis, hematochezia or dysuria. The illness began more than 7 days ago (started ~11/2018 with diarrhea and lower abdominal pain; saw PCP team and completed blood work and x-ray). The problem has not changed (intermittent) since onset.  The abdominal pain began more than 2 days ago. The abdominal pain has been unchanged since its onset. The abdominal pain is generalized (lower abdominal cramping; intermittent). The abdominal pain is relieved by bowel movements (worse with eating).   The illness is also significant for constipation (bowel movements once a day with straining with bowel movements: PAST TREATMENT: glycolax- not taking it (took once and minimal change so she stopped),  reports he thinks it will help but patient does not want to take it). The illness does not include chills, dysphagia or odynophagia. Associated symptoms comments: Taking bentyl 10 mg daily- reports this is helping. Significant associated medical issues include hemorrhoids. Associated medical issues do not include inflammatory bowel disease or GERD.     Review of Systems   Constitutional: Negative for appetite change, chills, fatigue, fever and weight  "loss.   HENT: Negative for sore throat and trouble swallowing.    Respiratory: Negative for cough, choking and shortness of breath.    Cardiovascular: Negative for chest pain.   Gastrointestinal: Positive for abdominal pain (intermittent abdominal cramping with eating a big meal; rated 6/10 when it occurs to abdomen and mouth) and constipation (bowel movements once a day with straining with bowel movements: PAST TREATMENT: glycolax- not taking it (took once and minimal change so she stopped),  reports he thinks it will help but patient does not want to take it). Negative for anal bleeding, blood in stool, diarrhea, dysphagia, hematemesis, hematochezia, melena, nausea, rectal pain and vomiting.   Genitourinary: Negative for difficulty urinating, dysuria and flank pain.   Neurological: Negative for weakness.       No LMP recorded. Patient has had a hysterectomy.  Past Medical History:   Diagnosis Date    Arthritis     right thumb,left elbow    Carpal tunnel syndrome, bilateral     Cataract     OS    DVT (deep venous thrombosis)     after hysterectomy    Endometrial thickening on ultra sound 2013    GERD (gastroesophageal reflux disease)     hiatal hernia    Hyperlipidemia     Memory loss     "hard time finding words"    Migraine headache     Osteoporosis     PVD (posterior vitreous detachment)     OU     Past Surgical History:   Procedure Laterality Date    CATARACT EXTRACTION W/  INTRAOCULAR LENS IMPLANT Right 01/30/2018    [facility]    CATARACT EXTRACTION W/  INTRAOCULAR LENS IMPLANT Left 03/13/2018    [facility]    CHALAZION EXCISION      [facility]    COLONOSCOPY  2009    COLONOSCOPY  05/02/2013    Dr. Cerda, in media: hemorrhoids, otherwise unremarkable; repeat in 5 years for screening    DILATION AND CURETTAGE OF UTERUS  5/2013    complex hyperplasia of the uterus    HYSTERECT[facility]  5/28/2013    [facility] for complex hyperplasia    LIPOMA RESECTION  2009    back    NASAL POLYP SURGERY  1960's    " OOPHORECTOMY       Family History   Problem Relation Age of Onset    Heart disease Mother     Diabetes Mother     Cancer Father         prostat    Colon cancer Brother 40    No Known Problems Sister     No Known Problems Maternal Aunt     No Known Problems Maternal Uncle     No Known Problems Paternal Aunt     No Known Problems Paternal Uncle     No Known Problems Maternal Grandmother     No Known Problems Maternal Grandfather     No Known Problems Paternal Grandmother     No Known Problems Paternal Grandfather     Breast cancer Neg Hx     Ovarian cancer Neg Hx     Amblyopia Neg Hx     Blindness Neg Hx     Cataracts Neg Hx     Glaucoma Neg Hx     Hypertension Neg Hx     Macular degeneration Neg Hx     Retinal detachment Neg Hx     Strabismus Neg Hx     Stroke Neg Hx     Thyroid disease Neg Hx     Crohn's disease Neg Hx     Ulcerative colitis Neg Hx     Celiac disease Neg Hx      Wt Readings from Last 10 Encounters:   12/02/19 50.1 kg (110 lb 7.2 oz)   10/11/19 49.3 kg (108 lb 11 oz)   06/06/19 51.6 kg (113 lb 12.1 oz)   05/17/19 51.4 kg (113 lb 5.1 oz)   11/06/18 51.1 kg (112 lb 10.5 oz)   08/31/18 51.4 kg (113 lb 5.1 oz)   03/13/18 52.2 kg (115 lb)   02/26/18 52.3 kg (115 lb 4.8 oz)   01/30/18 51.7 kg (114 lb)   01/05/18 52.8 kg (116 lb 6.5 oz)     Lab Results   Component Value Date    WBC 5.96 10/11/2019    HGB 12.4 10/11/2019    HCT 37.1 10/11/2019    MCV 90 10/11/2019     (H) 10/11/2019     CMP  Sodium   Date Value Ref Range Status   10/11/2019 140 136 - 145 mmol/L Final     Potassium   Date Value Ref Range Status   10/11/2019 3.6 3.5 - 5.1 mmol/L Final     Chloride   Date Value Ref Range Status   10/11/2019 107 95 - 110 mmol/L Final     CO2   Date Value Ref Range Status   10/11/2019 25 23 - 29 mmol/L Final     Glucose   Date Value Ref Range Status   10/11/2019 85 70 - 110 mg/dL Final     BUN, Bld   Date Value Ref Range Status   10/11/2019 9 8 - 23 mg/dL Final     Creatinine    Date Value Ref Range Status   10/11/2019 0.8 0.5 - 1.4 mg/dL Final     Calcium   Date Value Ref Range Status   10/11/2019 9.2 8.7 - 10.5 mg/dL Final     Total Protein   Date Value Ref Range Status   10/11/2019 7.1 6.0 - 8.4 g/dL Final     Albumin   Date Value Ref Range Status   10/11/2019 4.2 3.5 - 5.2 g/dL Final     Total Bilirubin   Date Value Ref Range Status   10/11/2019 0.5 0.1 - 1.0 mg/dL Final     Comment:     For infants and newborns, interpretation of results should be based  on gestational age, weight and in agreement with clinical  observations.  Premature Infant recommended reference ranges:  Up to 24 hours.............<8.0 mg/dL  Up to 48 hours............<12.0 mg/dL  3-5 days..................<15.0 mg/dL  6-29 days.................<15.0 mg/dL       Alkaline Phosphatase   Date Value Ref Range Status   10/11/2019 72 55 - 135 U/L Final     AST   Date Value Ref Range Status   10/11/2019 15 10 - 40 U/L Final     ALT   Date Value Ref Range Status   10/11/2019 9 (L) 10 - 44 U/L Final     Anion Gap   Date Value Ref Range Status   10/11/2019 8 8 - 16 mmol/L Final     eGFR if    Date Value Ref Range Status   10/11/2019 >60.0 >60 mL/min/1.73 m^2 Final     eGFR if non    Date Value Ref Range Status   10/11/2019 >60.0 >60 mL/min/1.73 m^2 Final     Comment:     Calculation used to obtain the estimated glomerular filtration  rate (eGFR) is the CKD-EPI equation.        Lab Results   Component Value Date    TSH 3.624 01/08/2018     Reviewed prior medical records including radiology report of 6/4/19 limited abdominal ultrasound; 5/27/19 CT abdomen pelvis; 6/6/19 visit note with Dr. Joseph; & endoscopy history (see surgical history).    Objective:      Physical Exam   Constitutional: She is oriented to person, place, and time. She appears well-developed and well-nourished. No distress.   HENT:   Mouth/Throat: Oropharynx is clear and moist and mucous membranes are normal. No oral lesions.  No oropharyngeal exudate.   Eyes: Pupils are equal, round, and reactive to light. Conjunctivae are normal. No scleral icterus.   Pulmonary/Chest: Effort normal and breath sounds normal. No respiratory distress. She has no wheezes.   Abdominal: Soft. Normal appearance and bowel sounds are normal. She exhibits no distension, no abdominal bruit and no mass. There is tenderness (mild) in the epigastric area. There is no rigidity, no rebound, no guarding, no tenderness at McBurney's point and negative Wilks's sign.   Neurological: She is alert and oriented to person, place, and time.   Skin: Skin is warm and dry. No rash noted. She is not diaphoretic. No erythema. No pallor.   Non-jaundiced   Psychiatric: She has a normal mood and affect. Her behavior is normal. Judgment and thought content normal. Her speech is delayed.   Nursing note and vitals reviewed.      Assessment:       1. Constipation, unspecified constipation type    2. Abdominal cramping    3. History of aphasia    4. Gallbladder polyp    5. Family history of colon cancer        Plan:       Constipation, unspecified constipation type  -  START   lactulose (CHRONULAC) 20 gram/30 mL Soln; Take 15 mLs (10 g total) by mouth once daily.  Dispense: 450 mL; Refill: 0  Recommend daily exercise as tolerated, adequate water intake (six 8-oz glasses of water daily), and high fiber diet. OTC fiber supplements are recommended if diet does not reach daily fiber goal (20-30 grams daily), such as Metamucil, Citrucel, or FiberCon (take as directed, separate from other oral medications by >2 hours).  -Recommend taking an OTC stool softener such as Colace as directed to avoid hard stools and straining with bowel movements PRN  -If still no improvement with these measures, call/follow-up  - schedule Colonoscopy, discussed procedure with the patient, including risks and benefits, patient verbalized understanding    Abdominal cramping  - schedule Colonoscopy, discussed procedure  with the patient, including risks and benefits, patient verbalized understanding  - continue bentyl 10 mg qid prn as directed  - patient and  declined EGD at this time    Gallbladder polyp  - recommend seeing PCP/general surgery for further evaluation and management, patient verbalized understanding    Family history of colon cancer  - schedule Colonoscopy, discussed procedure with the patient, including risks and benefits, patient verbalized understanding    History of aphasia  Recommend follow-up with Primary Care Provider & neurology for continued evaluation and management.    Follow up in about 1 month (around 1/2/2020), or if symptoms worsen or fail to improve.      If no improvement in symptoms or symptoms worsen, call/follow-up at clinic or go to ER.

## 2019-12-12 VITALS
RESPIRATION RATE: 18 BRPM | WEIGHT: 110.44 LBS | BODY MASS INDEX: 20.32 KG/M2 | DIASTOLIC BLOOD PRESSURE: 72 MMHG | SYSTOLIC BLOOD PRESSURE: 152 MMHG | HEART RATE: 64 BPM | HEIGHT: 62 IN

## 2019-12-18 ENCOUNTER — TELEPHONE (OUTPATIENT)
Dept: NEUROLOGY | Facility: CLINIC | Age: 79
End: 2019-12-18

## 2019-12-18 NOTE — TELEPHONE ENCOUNTER
----- Message from Berna Conde sent at 12/18/2019  7:37 AM CST -----  Contact: jacey Melvin [phone]  Type:  Sooner Apoointment Request    Caller is requesting a sooner appointment.  Caller declined first available appointment listed below.  Caller will not accept being placed on the waitlist and is requesting a message be sent to doctor.  Name of Caller: Nena  When is the first available appointment?  Symptoms: memory  Would the patient rather a call back or a response via [facility]? Best Call Back Number:[phone]  Additional Information:

## 2019-12-18 NOTE — TELEPHONE ENCOUNTER
Spoke with pt daughter and scheduled appt. Date/time/location confirmed. Verbalized understanding.

## 2019-12-23 ENCOUNTER — NURSE TRIAGE (OUTPATIENT)
Dept: ADMINISTRATIVE | Facility: CLINIC | Age: 79
End: 2019-12-23

## 2019-12-24 NOTE — TELEPHONE ENCOUNTER
"Spouse states pt has been having pulsating abdomen pain that comes and goes every 6 minutes or so, he states the pain is an 8/10, it has been going on for a couple of hours, advised him she needs to go to an ER for assessment, he states he does not think its that bad and wants to know if he can give her miralax, I asked if she is constipated and he states she has been going a little, during the call I could hear her crying in the background, advised him to have her assessed in the ER as this could be something more than they can handle at home, not sure if he will follow disposition    Reason for Disposition   [1] SEVERE pain AND [2] age > 60    Additional Information   Negative: Shock suspected (e.g., cold/pale/clammy skin, too weak to stand, low BP, rapid pulse)   Negative: Difficult to awaken or acting confused (e.g., disoriented, slurred speech)   Negative: Passed out (i.e., lost consciousness, collapsed and was not responding)   Negative: Sounds like a life-threatening emergency to the triager   Negative: Chest pain   Negative: Pain is mainly in upper abdomen  (if needed ask: "is it mainly above the belly button?")   Negative: Followed an abdomen (stomach) injury   Negative: [1] Abdominal pain AND [2] pregnant < 20 weeks   Negative: [1] Abdominal pain AND [2] pregnant > 20 weeks   Negative: [1] Abdominal pain AND [2] postpartum < 1 month since delivery    Protocols used: ABDOMINAL PAIN - FEMALE-A-AH      "

## 2019-12-27 ENCOUNTER — OFFICE VISIT (OUTPATIENT)
Dept: FAMILY MEDICINE | Facility: CLINIC | Age: 79
End: 2019-12-27
Payer: MEDICARE

## 2019-12-27 ENCOUNTER — LAB VISIT (OUTPATIENT)
Dept: LAB | Facility: HOSPITAL | Age: 79
End: 2019-12-27
Attending: NURSE PRACTITIONER
Payer: MEDICARE

## 2019-12-27 VITALS
HEIGHT: 62 IN | BODY MASS INDEX: 20 KG/M2 | HEART RATE: 64 BPM | OXYGEN SATURATION: 95 % | DIASTOLIC BLOOD PRESSURE: 80 MMHG | TEMPERATURE: 98 F | SYSTOLIC BLOOD PRESSURE: 110 MMHG | WEIGHT: 108.69 LBS

## 2019-12-27 DIAGNOSIS — L72.3 SEBACEOUS CYST: ICD-10-CM

## 2019-12-27 DIAGNOSIS — M25.561 PAIN AND SWELLING OF KNEE, RIGHT: Primary | ICD-10-CM

## 2019-12-27 DIAGNOSIS — M25.461 PAIN AND SWELLING OF KNEE, RIGHT: Primary | ICD-10-CM

## 2019-12-27 DIAGNOSIS — M25.561 PAIN AND SWELLING OF KNEE, RIGHT: ICD-10-CM

## 2019-12-27 DIAGNOSIS — L82.1 SEBORRHEIC KERATOSIS: ICD-10-CM

## 2019-12-27 DIAGNOSIS — M70.41 PREPATELLAR BURSITIS OF RIGHT KNEE: ICD-10-CM

## 2019-12-27 DIAGNOSIS — M25.461 PAIN AND SWELLING OF KNEE, RIGHT: ICD-10-CM

## 2019-12-27 LAB
BASOPHILS # BLD AUTO: 0.05 K/UL (ref 0–0.2)
BASOPHILS NFR BLD: 0.5 % (ref 0–1.9)
DIFFERENTIAL METHOD: ABNORMAL
EOSINOPHIL # BLD AUTO: 0.1 K/UL (ref 0–0.5)
EOSINOPHIL NFR BLD: 1.3 % (ref 0–8)
ERYTHROCYTE [DISTWIDTH] IN BLOOD BY AUTOMATED COUNT: 13.3 % (ref 11.5–14.5)
HCT VFR BLD AUTO: 40.9 % (ref 37–48.5)
HGB BLD-MCNC: 14 G/DL (ref 12–16)
LYMPHOCYTES # BLD AUTO: 2.3 K/UL (ref 1–4.8)
LYMPHOCYTES NFR BLD: 24.9 % (ref 18–48)
MCH RBC QN AUTO: 31.1 PG (ref 27–31)
MCHC RBC AUTO-ENTMCNC: 34.2 G/DL (ref 32–36)
MCV RBC AUTO: 91 FL (ref 82–98)
MONOCYTES # BLD AUTO: 1 K/UL (ref 0.3–1)
MONOCYTES NFR BLD: 11.2 % (ref 4–15)
NEUTROPHILS # BLD AUTO: 5.7 K/UL (ref 1.8–7.7)
NEUTROPHILS NFR BLD: 62.1 % (ref 38–73)
PLATELET # BLD AUTO: 464 K/UL (ref 150–350)
PMV BLD AUTO: 9.2 FL (ref 9.2–12.9)
RBC # BLD AUTO: 4.5 M/UL (ref 4–5.4)
WBC # BLD AUTO: 9.26 K/UL (ref 3.9–12.7)

## 2019-12-27 PROCEDURE — 1159F MED LIST DOCD IN RCRD: CPT | Mod: ,,, | Performed by: NURSE PRACTITIONER

## 2019-12-27 PROCEDURE — 99999 PR PBB SHADOW E&M-EST. PATIENT-LVL IV: ICD-10-PCS | Mod: PBBFAC,,, | Performed by: NURSE PRACTITIONER

## 2019-12-27 PROCEDURE — 85025 COMPLETE CBC W/AUTO DIFF WBC: CPT | Mod: PO

## 2019-12-27 PROCEDURE — 99214 OFFICE O/P EST MOD 30 MIN: CPT | Mod: S$PBB,,, | Performed by: NURSE PRACTITIONER

## 2019-12-27 PROCEDURE — 1159F PR MEDICATION LIST DOCUMENTED IN MEDICAL RECORD: ICD-10-PCS | Mod: ,,, | Performed by: NURSE PRACTITIONER

## 2019-12-27 PROCEDURE — 1125F AMNT PAIN NOTED PAIN PRSNT: CPT | Mod: ,,, | Performed by: NURSE PRACTITIONER

## 2019-12-27 PROCEDURE — 1125F PR PAIN SEVERITY QUANTIFIED, PAIN PRESENT: ICD-10-PCS | Mod: ,,, | Performed by: NURSE PRACTITIONER

## 2019-12-27 PROCEDURE — 99214 OFFICE O/P EST MOD 30 MIN: CPT | Mod: PBBFAC,PO | Performed by: NURSE PRACTITIONER

## 2019-12-27 PROCEDURE — 36415 COLL VENOUS BLD VENIPUNCTURE: CPT | Mod: PO

## 2019-12-27 PROCEDURE — 99999 PR PBB SHADOW E&M-EST. PATIENT-LVL IV: CPT | Mod: PBBFAC,,, | Performed by: NURSE PRACTITIONER

## 2019-12-27 PROCEDURE — 99214 PR OFFICE/OUTPT VISIT, EST, LEVL IV, 30-39 MIN: ICD-10-PCS | Mod: S$PBB,,, | Performed by: NURSE PRACTITIONER

## 2019-12-27 RX ORDER — MUPIROCIN 20 MG/G
OINTMENT TOPICAL 3 TIMES DAILY
Qty: 1 TUBE | Refills: 0 | Status: SHIPPED | OUTPATIENT
Start: 2019-12-27 | End: 2020-02-05

## 2019-12-27 RX ORDER — DICYCLOMINE HYDROCHLORIDE 10 MG/1
CAPSULE ORAL
Refills: 0 | COMMUNITY
Start: 2019-12-09 | End: 2019-12-31

## 2019-12-27 RX ORDER — LACTULOSE 10 G/15ML
SOLUTION ORAL; RECTAL
Refills: 0 | Status: ON HOLD | COMMUNITY
Start: 2019-12-03 | End: 2020-11-23 | Stop reason: HOSPADM

## 2019-12-27 NOTE — PROGRESS NOTES
Subjective:       Patient ID: Sascha Martinez is a 79 y.o. female.    Chief Complaint: Follow-up (ER follow up for staph infection to right knee went to ER on Tuesday )    Patient who is known to me presents for knee pain. She was seen in the ER on Tuesday due to pain and redness of the right knee. The pain and warmth has improved, but the swelling and redness is the same. She is able to walk on the knee, which she was not able to on Tuesday. She has been taking her clindamycin. In addition she has an itchy red bump to the back of her head and some new moles to her back.     Review of Systems   Constitutional: Positive for activity change. Negative for unexpected weight change.   Eyes: Negative for discharge.   Respiratory: Negative for wheezing.    Cardiovascular: Negative for chest pain and palpitations.   Gastrointestinal: Negative for constipation, diarrhea and vomiting.   Genitourinary: Negative for difficulty urinating and hematuria.   Musculoskeletal: Positive for arthralgias, gait problem and joint swelling.   Skin: Positive for color change and wound.   Neurological: Positive for weakness (to knee). Negative for headaches.   Psychiatric/Behavioral: Negative for confusion and dysphoric mood.       Objective:      Physical Exam   Constitutional: She is oriented to person, place, and time. She appears well-developed and well-nourished.   HENT:   Head: Normocephalic and atraumatic.   Right Ear: External ear normal.   Left Ear: External ear normal.   Nose: Nose normal.   Mouth/Throat: Oropharynx is clear and moist.   Eyes: Pupils are equal, round, and reactive to light.   Neck: Normal range of motion.   Cardiovascular: Normal rate, regular rhythm, normal heart sounds and intact distal pulses.   Pulmonary/Chest: Effort normal and breath sounds normal.   Abdominal: Soft. Bowel sounds are normal.   Musculoskeletal:        Right knee: She exhibits decreased range of motion, swelling and erythema.        Legs:  Knee  is slightly warm and has slight erythema.    Neurological: She is alert and oriented to person, place, and time.   Skin: Skin is warm and dry.        Nursing note and vitals reviewed.                      Assessment:       1. Pain and swelling of knee, right    2. Prepatellar bursitis of right knee    3. Sebaceous cyst    4. Seborrheic keratosis        Plan:       Sascha was seen today for follow-up.    Diagnoses and all orders for this visit:    Pain and swelling of knee, right  -     CBC auto differential; Future  -     Ambulatory referral/consult to Orthopedics; Future    Prepatellar bursitis of right knee  -     CBC auto differential; Future  -     Ambulatory referral/consult to Orthopedics; Future    Sebaceous cyst  -     mupirocin (BACTROBAN) 2 % ointment; Apply topically 3 (three) times daily.    Seborrheic keratosis  Reassurance given and explained these are benign.     Will follow up on cbc to check improvement on white count. Strict ER precautions given if symptoms worsen or do not improve. Patient and daughter verbalized understanding.

## 2019-12-27 NOTE — PATIENT INSTRUCTIONS
Understanding Prepatellar Bursitis    A bursa is a thin, slippery, sac-like film. It contains a small amount of fluid. This structure is found between bones and soft tissues in and around joints. A bursa cushions and protects a joint. It keeps parts of a joint from rubbing against each other.  The prepatellar bursa is found on top of the kneecap (patella). It lies just under the skin. If this bursa becomes irritated and inflamed, the condition is called prepatellar bursitis.  Causes of prepatellar bursitis  A common cause of this problem is repeated kneeling on the floor. For this reason, it is sometimes called s knee. Injury from a blow to your kneecap can also cause it. Running on uneven ground may also play a role.  Symptoms of prepatellar bursitis  These may include:  · Knee pain that gets worse with bending or pressure to the knee, and gets better with rest  · Swelling over the kneecap  · Tenderness or warmth over the kneecap  · Crackling sound from the kneecap with movement  Treatment for prepatellar bursitis  This problem often gets better with rest and medicines. Other treatments may be needed. Possible treatments include:  · Resting your knee. This allows the area to heal. It includes avoiding things that trigger symptoms.  · Prescription or over-the-counter pain medicines. These help reduce pain and swelling.  · Stretching and strengthening exercises. These help improve the strength and flexibility of the muscles around the knee.  · Cold packs or heat packs. These help reduce pain and swelling.  · Physical therapy. This may include exercises, ultrasound, or other treatments.  · Kneepads. These help protect your knees during sports.  · Injection of medicine into the bursa or drainage of fluid from the bursa. These may help relieve symptoms.  For symptoms that dont get better with these treatments, you may need surgery to remove the bursa.  Possible complications  · If germs get into the bursa  through a cut in your skin, the bursa may become infected. An infection is generally treated with antibiotic medicine. In some cases, the infected bursa must be removed.  · If your knee isnt given time to heal, this problem may become long-term (chronic). This can lead to trouble moving the knee joint.     When to call your healthcare provider  Call your healthcare provider right away if you have:  · Fever of 100.4°F (38°C) or higher, or as directed  · Increased swelling or warmth of the area, or drainage from the area  · Symptoms that dont get better or get worse  · New symptoms   Date Last Reviewed: 3/10/2016  © 0395-8341 [employer]. [address] [place] [address], [place] [address]. All rights reserved. This information is not intended as a substitute for professional medical care. Always follow your healthcare professional's instructions.        Cellulitis  Cellulitis is an infection of the deep layers of skin. A break in the skin, such as a cut or scratch, can let bacteria under the skin. If the bacteria get to deep layers of the skin, it can be serious. If not treated, cellulitis can get into the bloodstream and lymph nodes. The infection can then spread throughout the body. This causes serious illness.  Cellulitis causes the affected skin to become red, swollen, warm, and sore. The reddened areas have a visible border. An open sore may leak fluid (pus). You may have a fever, chills, and pain.  Cellulitis is treated with antibiotics taken for 7 to 10 days. An open sore may be cleaned and covered with cool wet gauze. Symptoms should get better 1 to 2 days after treatment is started. Make sure to take all the antibiotics for the full number of days until they are gone. Keep taking the medicine even if your symptoms go away.  Home care  Follow these tips:  · Limit the use of the part of your body with cellulitis.   · If the infection is on your leg, keep your leg raised while sitting. This will help to  reduce swelling.  · Take all of the antibiotic medicine exactly as directed until it is gone. Do not miss any doses, especially during the first 7 days. Dont stop taking the medicine when your symptoms get better.  · Keep the affected area clean and dry.  · Wash your hands with soap and warm water before and after touching your skin. Anyone else who touches your skin should also wash his or her hands. Don't share towels.  Follow-up care  Follow up with your healthcare provider, or as advised. If your infection does not go away on the first antibiotic, your healthcare provider will prescribe a different one.  When to seek medical advice  Call your healthcare provider right away if any of these occur:  · Red areas that spread  · Swelling or pain that gets worse  · Fluid leaking from the skin (pus)  · Fever higher of 100.4º F (38.0º C) or higher after 2 days on antibiotics  Date Last Reviewed: 9/1/2016  © 5178-9683 [employer]. [address] [place] [address], [place] [address]. All rights reserved. This information is not intended as a substitute for professional medical care. Always follow your healthcare professional's instructions.        Understanding Seborrheic Keratosis  Seborrheic keratoses are wart-like growths on the skin. These growths are not cancer. Many people get them, especially after age 30.  How to say it  [id number]   What causes seborrheic keratoses?  Doctors do not know what causes seborrheic keratoses. They may run in families. In addition, they become more common as people get older.  What are the symptoms of seborrheic keratoses?  Here is what seborrheic keratoses often look like:  · They tend to be round or oval in shape. They can be very small or about as big across as a quarter.  · They can appear singly or in clusters.  · They tend to be tan, brown, or black in color.  · The edges may be scalloped or uneven-looking.  · They can have a waxy or scaly look.  · They can  be a bit raised, appearing to be stuck on the skin.  · They may become red and irritated if scratched or rubbed by clothing  Sebhorreic keratoses are not painful, but they may be itchy. They can become irritated if they are continually rubbed by skin or clothing. Seborrheic keratoses most often appear on the face, arms, chest, back, or belly.  How are seborrheic keratoses treated?  Seborrheic keratoses dont need to be treated unless they bother you. You may choose to have them removed because you find them unattractive. You may also want them removed because they get irritated and uncomfortable. A healthcare provider can remove them in an office visit. Ways that seborrheic keratoses can be removed include:  · Freezing them off with liquid nitrogen  · Cutting them off with a scalpel  · Burning them off with electricity  What are the complications of seborrheic keratoses?  Seborrheic keratoses are not cancer, but they can look like some types of skin cancer. So its a good idea to ask your healthcare provider to check any new skin growths. Ask your healthcare provider about any skin problem that concerns you.  When should I call my healthcare provider?  Call your healthcare provider right away if any of these occur:  · You develop a lot of seborrheic keratoses very quickly  · You have a sore that does not heal within a few weeks, or heals and then comes back  · You have a mole or skin growth that is changing in size, shape, or color  · You have a mole or skin growth that looks different on one side from the other  · You have a mole or skin growth that is not the same color throughout   Date Last Reviewed: 5/1/2016  © 4491-1061 [employer]. [address] [place] [address], [place] [address]. All rights reserved. This information is not intended as a substitute for professional medical care. Always follow your healthcare professional's instructions.

## 2019-12-31 RX ORDER — DICYCLOMINE HYDROCHLORIDE 10 MG/1
CAPSULE ORAL
Qty: 120 CAPSULE | Refills: 0 | Status: SHIPPED | OUTPATIENT
Start: 2019-12-31 | End: 2020-04-14

## 2020-02-03 ENCOUNTER — PATIENT OUTREACH (OUTPATIENT)
Dept: ADMINISTRATIVE | Facility: OTHER | Age: 80
End: 2020-02-03

## 2020-02-04 ENCOUNTER — TELEPHONE (OUTPATIENT)
Dept: NEUROLOGY | Facility: CLINIC | Age: 80
End: 2020-02-04

## 2020-02-05 ENCOUNTER — OFFICE VISIT (OUTPATIENT)
Dept: NEUROLOGY | Facility: CLINIC | Age: 80
End: 2020-02-05
Payer: MEDICARE

## 2020-02-05 ENCOUNTER — TELEPHONE (OUTPATIENT)
Dept: NEUROLOGY | Facility: CLINIC | Age: 80
End: 2020-02-05

## 2020-02-05 VITALS
BODY MASS INDEX: 19.9 KG/M2 | HEIGHT: 62 IN | SYSTOLIC BLOOD PRESSURE: 185 MMHG | DIASTOLIC BLOOD PRESSURE: 79 MMHG | RESPIRATION RATE: 20 BRPM | HEART RATE: 62 BPM | WEIGHT: 108.13 LBS

## 2020-02-05 DIAGNOSIS — G93.40 ENCEPHALOPATHY: ICD-10-CM

## 2020-02-05 DIAGNOSIS — F03.90 DEMENTIA WITHOUT BEHAVIORAL DISTURBANCE, UNSPECIFIED DEMENTIA TYPE: Primary | ICD-10-CM

## 2020-02-05 PROCEDURE — 99999 PR PBB SHADOW E&M-EST. PATIENT-LVL IV: ICD-10-PCS | Mod: PBBFAC,,, | Performed by: PSYCHIATRY & NEUROLOGY

## 2020-02-05 PROCEDURE — 99214 OFFICE O/P EST MOD 30 MIN: CPT | Mod: PBBFAC,PN | Performed by: PSYCHIATRY & NEUROLOGY

## 2020-02-05 PROCEDURE — 99205 PR OFFICE/OUTPT VISIT, NEW, LEVL V, 60-74 MIN: ICD-10-PCS | Mod: S$PBB,,, | Performed by: PSYCHIATRY & NEUROLOGY

## 2020-02-05 PROCEDURE — 99999 PR PBB SHADOW E&M-EST. PATIENT-LVL IV: CPT | Mod: PBBFAC,,, | Performed by: PSYCHIATRY & NEUROLOGY

## 2020-02-05 PROCEDURE — 99205 OFFICE O/P NEW HI 60 MIN: CPT | Mod: S$PBB,,, | Performed by: PSYCHIATRY & NEUROLOGY

## 2020-02-05 RX ORDER — RIVASTIGMINE 4.6 MG/24H
1 PATCH, EXTENDED RELEASE TRANSDERMAL DAILY
Qty: 30 PATCH | Refills: 11 | Status: SHIPPED | OUTPATIENT
Start: 2020-02-05 | End: 2020-03-18 | Stop reason: SDUPTHER

## 2020-02-05 NOTE — TELEPHONE ENCOUNTER
Pt did not want to schedule her MRI at this time; pt stated she wanted to go home and think about today's visit before scheduling.

## 2020-02-05 NOTE — PROGRESS NOTES
"Date of service:  2/5/2020    Chief complaint:  Memory Loss    History of present illness:  The patient is a 80 y.o. female referred for evaluation of memory loss.  I actually saw her for this issue >3 years ago.  I ordered some labs and an MRI of the brain at the time, which she completed.  She did not get the neuropsychological evaluation I ordered, nor did she follow up as I wanted.  Her family feels her memory issue began about 2-1/2 years ago. It involves short-term memory more than long-term memory.  She has difficulties with executive function.  There are also issues with multiple step processes. She has problems with ADLs.  They feel like she has issues with word finding.  She does get lost in familiar areas. There are no hallucinations. There are some personality changes with her family feels like she may have depression.    In the past, she did try Aricept.  She had significant side effects, including severe GI side effects.      Past Medical History:   Diagnosis Date    Arthritis     right thumb,left elbow    Carpal tunnel syndrome, bilateral     Cataract     OS    DVT (deep venous thrombosis)     after hysterectomy    Endometrial thickening on ultra sound 2013    GERD (gastroesophageal reflux disease)     hiatal hernia    Hyperlipidemia     Memory loss     "hard time finding words"    Migraine headache     Osteoporosis     PVD (posterior vitreous detachment)     OU       Past Surgical History:   Procedure Laterality Date    CATARACT EXTRACTION W/  INTRAOCULAR LENS IMPLANT Right 01/30/2018    [facility]    CATARACT EXTRACTION W/  INTRAOCULAR LENS IMPLANT Left 03/13/2018    [facility]    CHALAZION EXCISION      [facility]    COLONOSCOPY  2009    COLONOSCOPY  05/02/2013    Dr. Cerda, in media: hemorrhoids, otherwise unremarkable; repeat in 5 years for screening    DILATION AND CURETTAGE OF UTERUS  5/2013    complex hyperplasia of the uterus    HYSTERECTOMY  5/28/2013    [facility] for complex " hyperplasia    LIPOMA RESECTION  2009    back    NASAL POLYP SURGERY  1960's    OOPHORECTOMY         Family History   Problem Relation Age of Onset    Heart disease Mother     Diabetes Mother     Cancer Father         prostat    Colon cancer Brother 40    No Known Problems Sister     No Known Problems Maternal Aunt     No Known Problems Maternal Uncle     No Known Problems Paternal Aunt     No Known Problems Paternal Uncle     No Known Problems Maternal Grandmother     No Known Problems Maternal Grandfather     No Known Problems Paternal Grandmother     No Known Problems Paternal Grandfather     Breast cancer Neg Hx     Ovarian cancer Neg Hx     Amblyopia Neg Hx     Blindness Neg Hx     Cataracts Neg Hx     Glaucoma Neg Hx     Hypertension Neg Hx     Macular degeneration Neg Hx     Retinal detachment Neg Hx     Strabismus Neg Hx     Stroke Neg Hx     Thyroid disease Neg Hx     Crohn's disease Neg Hx     Ulcerative colitis Neg Hx     Celiac disease Neg Hx        Social History     Socioeconomic History    Marital status:      Spouse name: Not on file    Number of children: Not on file    Years of education: Not on file    Highest education level: Not on file   Occupational History    Not on file   Social Needs    Financial resource strain: Not hard at all    Food insecurity:     Worry: Never true     Inability: Never true    Transportation needs:     Medical: No     Non-medical: No   Tobacco Use    Smoking status: Never Smoker    Smokeless tobacco: Never Used   Substance and Sexual Activity    Alcohol use: Not Currently     Frequency: Never     Drinks per session: Patient refused     Binge frequency: Never    Drug use: No    Sexual activity: Not Currently     Partners: Male     Birth control/protection: Post-menopausal   Lifestyle    Physical activity:     Days per week: 0 days     Minutes per session: 0 min    Stress: To some extent   Relationships    Social  "connections:     Talks on phone: Once a week     Gets together: Once a week     Attends Jew service: Not on file     Active member of club or organization: No     Attends meetings of clubs or organizations: Never     Relationship status:    Other Topics Concern    Not on file   Social History Narrative    Not on file        Review of patient's allergies indicates:   Allergen Reactions    Ketorolac      Other reaction(s): Rash    Cefazolin Rash    Codeine Nausea And Vomiting     Other reaction(s): Nausea       Review of Systems  General/Constitutional:  No unintentional weight loss, No change in appetite  Eyes/Vision:  No change in vision, No double vision  ENT:  No frequent nose bleeds, No ringing in the ears  Respiratory:  No cough, No wheezing  Cardiovascular:  No chest pain, No palpitations  Gastrointestinal:  No jaundice, No nausea/vomiting  Genitourinary:  No incontinence, No burning with urination  Hematologic/Lymphatic:  No easy bruising/bleeding, No night sweats  Neurological:  No numbness, No weakness  Endocrine:  No fatigue, No heat/cold intolerance  Allergy/Immunologic:  No fevers, No chills  Musculoskeletal:  No muscle pain, No joint pain   Psychiatric:  No thoughts of harming self/others, No depression  Integumentary:  No rashes, No sores that do not heal    Physical exam:  BP (!) 185/79   Pulse 62   Resp 20   Ht 5' 2" (1.575 m)   Wt 49 kg (108 lb 2.2 oz)   BMI 19.78 kg/m²   General: Well developed, well nourished.  No acute distress.  ENT: Mucus membranes moist.  Atraumatic external nose and ears.  Lymphatic: No apparent lymphadenopathy.  Cardiovascular: Regular rate and rhythm.  Pulmonary: No increased work of breathing.  Abdomen/GI: No guarding.  Musculoskeletal: No obvious joint deformities, moves all extremities well.    Neurological exam:  Mental status: Awake and alert.  Oriented x1.  Speech sparse with apparent issues related to word finding.  Recent and remote memory " appear to be limited.  Fund of knowledge limited.  MMSE = 4/30.  Cranial nerves: Pupils equal round and reactive to light, extraocular movements intact, facial strength and sensation intact bilaterally, palate and tongue midline, hearing grossly intact bilaterally.  Motor: 5 out of 5 strength throughout the upper and lower extremities bilaterally. Normal bulk and tone.  Sensation: Intact to light touch and temperature bilaterally.  DTR: 1+ at the knees and biceps bilaterally.  Coordination: Finger-nose-finger testing intact bilaterally.  Gait: Normal gait. Mild to moderate difficulty with tandem.      Data base:  Notes from her PCP's practice were reviewed.  Briefly summarized, the most recent visits centered on knee pain and abdominal pain.      Caregiver name: Adam  Relationship to the patient:     Does the patient have a living will? no  Does the patient have a designated healthcare POA? no  Does the patient have a designated general POA? no    Have educational materials/resources been provided? yes      Activities of Daily Living    Bathing: Independent  Dressing: Needs Help  Grooming: Independent  Mouth Care: Independent  Toileting: Independent  Transferring Bed/Chair: Independent  Walking: Independent  Climbing: Independent  Eating: Independent      Instrumental Activities of Daily Living    Shopping: Cannot Do  Cooking: Cannot Do  Managing Medications: Needs Help  Using the phone and looking up numbers: Cannot Do  Doing Housework: Independent  Doing Laundry: Needs Help  Driving or using public transportation: Cannot Do  Managing finances: Cannot Do    Functional Assessment Staging  4   Decreased ability to perform complex tasks, e.g. planning dinner for guests, handling personal finances (such as forgetting to pay bills), difficulty marketing, etc.*         Depression Patient Health Questionnaire 10/11/2019 2/26/2018 3/29/2017 5/16/2016 3/5/2015   Over the last two weeks how often have you been  bothered by little interest or pleasure in doing things 0 0 0 3 0   Over the last two weeks how often have you been bothered by feeling down, depressed or hopeless 0 0 0 0 0   PHQ-2 Total Score 0 0 0 3 0       Assessment and plan:  The patient is a 80 y.o. female referred for evaluation of memory loss. I feel that the differential diagnosis includes FTD. I think a reasonable workup would feature labs, MRI of the brain, and neuropsychological testing.  Since she did not tolerate Aricept previously due to significant GI issues, we will try Exelon patches.  We will plan on seeing the patient back in several months.    Cognition and function were assessed and the patient's functional assessment staging test (FAST) score is 4. Patient is felt to not have decision making capacity. PHQ-2 score was 0. Medications were reconciled and reviewed for high-risk medications. The patient's behavior and psychiatric health were reviewed and addressed. The patient and family were counseled on safety in the home and operation of vehicles. Discussed caregiver needs and social support. Advance Care Plan was reviewed. Written care plan and support information provided to the patient or caregiver and information was provided.

## 2020-02-16 ENCOUNTER — PATIENT MESSAGE (OUTPATIENT)
Dept: NEUROLOGY | Facility: CLINIC | Age: 80
End: 2020-02-16

## 2020-03-18 ENCOUNTER — LAB VISIT (OUTPATIENT)
Dept: LAB | Facility: HOSPITAL | Age: 80
End: 2020-03-18
Attending: INTERNAL MEDICINE
Payer: MEDICARE

## 2020-03-18 ENCOUNTER — NURSE TRIAGE (OUTPATIENT)
Dept: ADMINISTRATIVE | Facility: CLINIC | Age: 80
End: 2020-03-18

## 2020-03-18 ENCOUNTER — OFFICE VISIT (OUTPATIENT)
Dept: FAMILY MEDICINE | Facility: CLINIC | Age: 80
End: 2020-03-18
Payer: MEDICARE

## 2020-03-18 VITALS
HEIGHT: 62 IN | BODY MASS INDEX: 19.63 KG/M2 | TEMPERATURE: 98 F | OXYGEN SATURATION: 99 % | SYSTOLIC BLOOD PRESSURE: 130 MMHG | DIASTOLIC BLOOD PRESSURE: 84 MMHG | WEIGHT: 106.69 LBS | HEART RATE: 67 BPM

## 2020-03-18 DIAGNOSIS — F03.C0 SEVERE DEMENTIA: ICD-10-CM

## 2020-03-18 DIAGNOSIS — F03.C0 SEVERE DEMENTIA: Primary | ICD-10-CM

## 2020-03-18 LAB
ALBUMIN SERPL BCP-MCNC: 4.2 G/DL (ref 3.5–5.2)
ALP SERPL-CCNC: 83 U/L (ref 55–135)
ALT SERPL W/O P-5'-P-CCNC: 10 U/L (ref 10–44)
ANION GAP SERPL CALC-SCNC: 10 MMOL/L (ref 8–16)
AST SERPL-CCNC: 17 U/L (ref 10–40)
BASOPHILS # BLD AUTO: 0.07 K/UL (ref 0–0.2)
BASOPHILS NFR BLD: 0.7 % (ref 0–1.9)
BILIRUB SERPL-MCNC: 0.6 MG/DL (ref 0.1–1)
BUN SERPL-MCNC: 12 MG/DL (ref 8–23)
CALCIUM SERPL-MCNC: 9.3 MG/DL (ref 8.7–10.5)
CHLORIDE SERPL-SCNC: 108 MMOL/L (ref 95–110)
CO2 SERPL-SCNC: 24 MMOL/L (ref 23–29)
CREAT SERPL-MCNC: 0.8 MG/DL (ref 0.5–1.4)
DIFFERENTIAL METHOD: ABNORMAL
EOSINOPHIL # BLD AUTO: 0.1 K/UL (ref 0–0.5)
EOSINOPHIL NFR BLD: 0.6 % (ref 0–8)
ERYTHROCYTE [DISTWIDTH] IN BLOOD BY AUTOMATED COUNT: 13.1 % (ref 11.5–14.5)
EST. GFR  (AFRICAN AMERICAN): >60 ML/MIN/1.73 M^2
EST. GFR  (NON AFRICAN AMERICAN): >60 ML/MIN/1.73 M^2
GLUCOSE SERPL-MCNC: 84 MG/DL (ref 70–110)
HCT VFR BLD AUTO: 43.9 % (ref 37–48.5)
HGB BLD-MCNC: 14.1 G/DL (ref 12–16)
IMM GRANULOCYTES # BLD AUTO: 0.03 K/UL (ref 0–0.04)
IMM GRANULOCYTES NFR BLD AUTO: 0.3 % (ref 0–0.5)
LYMPHOCYTES # BLD AUTO: 2.1 K/UL (ref 1–4.8)
LYMPHOCYTES NFR BLD: 20 % (ref 18–48)
MCH RBC QN AUTO: 30.4 PG (ref 27–31)
MCHC RBC AUTO-ENTMCNC: 32.1 G/DL (ref 32–36)
MCV RBC AUTO: 95 FL (ref 82–98)
MONOCYTES # BLD AUTO: 0.8 K/UL (ref 0.3–1)
MONOCYTES NFR BLD: 7.4 % (ref 4–15)
NEUTROPHILS # BLD AUTO: 7.4 K/UL (ref 1.8–7.7)
NEUTROPHILS NFR BLD: 71 % (ref 38–73)
NRBC BLD-RTO: 0 /100 WBC
PLATELET # BLD AUTO: 508 K/UL (ref 150–350)
PMV BLD AUTO: 9.2 FL (ref 9.2–12.9)
POTASSIUM SERPL-SCNC: 4 MMOL/L (ref 3.5–5.1)
PROT SERPL-MCNC: 7.9 G/DL (ref 6–8.4)
RBC # BLD AUTO: 4.64 M/UL (ref 4–5.4)
SODIUM SERPL-SCNC: 142 MMOL/L (ref 136–145)
TSH SERPL DL<=0.005 MIU/L-ACNC: 2.79 UIU/ML (ref 0.4–4)
VIT B12 SERPL-MCNC: 551 PG/ML (ref 210–950)
WBC # BLD AUTO: 10.37 K/UL (ref 3.9–12.7)

## 2020-03-18 PROCEDURE — 99999 PR PBB SHADOW E&M-EST. PATIENT-LVL III: ICD-10-PCS | Mod: PBBFAC,,, | Performed by: INTERNAL MEDICINE

## 2020-03-18 PROCEDURE — 36415 COLL VENOUS BLD VENIPUNCTURE: CPT | Mod: PO

## 2020-03-18 PROCEDURE — 86592 SYPHILIS TEST NON-TREP QUAL: CPT

## 2020-03-18 PROCEDURE — 82607 VITAMIN B-12: CPT

## 2020-03-18 PROCEDURE — 99213 OFFICE O/P EST LOW 20 MIN: CPT | Mod: PBBFAC,PO | Performed by: INTERNAL MEDICINE

## 2020-03-18 PROCEDURE — 99214 PR OFFICE/OUTPT VISIT, EST, LEVL IV, 30-39 MIN: ICD-10-PCS | Mod: S$PBB,,, | Performed by: INTERNAL MEDICINE

## 2020-03-18 PROCEDURE — 80053 COMPREHEN METABOLIC PANEL: CPT

## 2020-03-18 PROCEDURE — 99214 OFFICE O/P EST MOD 30 MIN: CPT | Mod: S$PBB,,, | Performed by: INTERNAL MEDICINE

## 2020-03-18 PROCEDURE — 85025 COMPLETE CBC W/AUTO DIFF WBC: CPT

## 2020-03-18 PROCEDURE — 82747 ASSAY OF FOLIC ACID RBC: CPT

## 2020-03-18 PROCEDURE — 99999 PR PBB SHADOW E&M-EST. PATIENT-LVL III: CPT | Mod: PBBFAC,,, | Performed by: INTERNAL MEDICINE

## 2020-03-18 PROCEDURE — 84443 ASSAY THYROID STIM HORMONE: CPT

## 2020-03-18 RX ORDER — RIVASTIGMINE 4.6 MG/24H
1 PATCH, EXTENDED RELEASE TRANSDERMAL DAILY
Qty: 30 PATCH | Refills: 11 | Status: SHIPPED | OUTPATIENT
Start: 2020-03-18 | End: 2021-03-18

## 2020-03-18 NOTE — TELEPHONE ENCOUNTER
Pts daughter had call in to nurse triage line, but pt currently at PCP office for visit. instructed pts  to call back with any other questions or problems if needed in the future.     Reason for Disposition   Caller has already spoken with the PCP (or office), and has no further questions    Protocols used: NO CONTACT OR DUPLICATE CONTACT CALL-A-[place]

## 2020-03-18 NOTE — PROGRESS NOTES
Subjective:       Patient ID: Sascha Martinez is a 80 y.o. female.    Chief Complaint: Dementia (wants a medication for dementia)    HPI     Pt here with daughter.     She lives in apt with her .  has rodri issues but is mentally intact per daughter    Past medical history includes potential frontal temporal dementia.  Followed by Dr. Cuenca.  Past treatments include Aricept (GI issues). Would not consent to further testing.     Having issues with wandering off. Was found down the road. She is also having issues with anger. Apparently she had argument with . She was given exelon patch but has not tried it.   She denies feeling confused and does not think that walking by herself is an issue. Then after discussion she thinks it was a bad decision. Her speech is fluent but subject is very vague and does not appear to make sense to me. She does state that she sleeps well. She is taking bentyl once daily. Higher doses could cause worsening delirium.   -She is oriented to name only. She know what to do if her shirt was on fire.   -discussed daily meals and sleep schedule.   -We discussed low dose citalopram, but she seemed resistant  -She does appear to be ok with exelon   -Daughter will check on daily.     Current Outpatient Medications on File Prior to Visit   Medication Sig Dispense Refill    dicyclomine (BENTYL) 10 MG capsule TAKE 1 CAPSULE (10 MG TOTAL) BY MOUTH BEFORE MEALS AND AT BEDTIME AS NEEDED. 120 capsule 0    lactulose (CHRONULAC) 10 gram/15 mL solution TAKE 15 MLS (10 G TOTAL) BY MOUTH ONCE DAILY.  0    [DISCONTINUED] rivastigmine (EXELON) 4.6 mg/24 hr PT24 Place 1 patch onto the skin once daily. (Patient not taking: Reported on 3/18/2020) 30 patch 11     No current facility-administered medications on file prior to visit.      I personally reviewed past medical, family and social history.  Review of Systems      Objective:     Vitals:    03/18/20 0958   BP: 130/84   Pulse: 67   Temp:  97.9 °F (36.6 °C)        Physical Exam   Constitutional: She is oriented to person, place, and time. She appears well-developed and well-nourished. No distress.   HENT:   Head: Normocephalic and atraumatic.   Eyes: Pupils are equal, round, and reactive to light. EOM are normal. Right eye exhibits no discharge. Left eye exhibits no discharge. No scleral icterus.   Neck: Normal range of motion. Neck supple. No JVD present. No thyromegaly present.   Cardiovascular: Normal rate, regular rhythm and normal heart sounds. Exam reveals no gallop and no friction rub.   No murmur heard.  Pulmonary/Chest: Effort normal and breath sounds normal. No respiratory distress. She has no wheezes.   Abdominal: Soft. Bowel sounds are normal. She exhibits no distension and no mass. There is no tenderness.   Musculoskeletal: Normal range of motion. She exhibits no edema.   Lymphadenopathy:     She has no cervical adenopathy.   Neurological: She is alert and oriented to person, place, and time. No cranial nerve deficit. Coordination normal.   Could not follow all commands    Skin: Skin is warm and dry. Capillary refill takes less than 2 seconds. No rash noted. She is not diaphoretic.   Psychiatric:   Worried look on face         Assessment/Plan   Sascha was seen today for dementia.    Diagnoses and all orders for this visit:    Severe dementia  -     rivastigmine (EXELON) 4.6 mg/24 hr PT24; Place 1 patch onto the skin once daily.  -     TSH; Future  -     RPR; Future  -     Comprehensive metabolic panel; Future  -     CBC auto differential; Future  -     Vitamin B12; Future  -     Folate RBC; Future

## 2020-03-19 LAB — RPR SER QL: NORMAL

## 2020-03-20 ENCOUNTER — TELEPHONE (OUTPATIENT)
Dept: FAMILY MEDICINE | Facility: CLINIC | Age: 80
End: 2020-03-20

## 2020-03-20 DIAGNOSIS — D75.839 THROMBOCYTOSIS: Primary | ICD-10-CM

## 2020-03-20 LAB — FOLATE RBC-MCNC: 503 NG/ML (ref 280–791)

## 2020-03-20 NOTE — TELEPHONE ENCOUNTER
----- Message from Lorena Baltazar sent at 3/20/2020  3:54 PM CDT -----  Contact: Pt  Type:  Patient Returning Call    Who Called:  Pt  Who Left Message for Patient:  nurse  Does the patient know what this is regarding?:  Test results  Best Call Back Number:  [phone]  Additional Information:  Please Advise ---Thank you

## 2020-04-01 ENCOUNTER — TELEPHONE (OUTPATIENT)
Dept: NEUROLOGY | Facility: CLINIC | Age: 80
End: 2020-04-01

## 2020-04-14 RX ORDER — DICYCLOMINE HYDROCHLORIDE 10 MG/1
CAPSULE ORAL
Qty: 120 CAPSULE | Refills: 0 | Status: SHIPPED | OUTPATIENT
Start: 2020-04-14 | End: 2020-06-29 | Stop reason: SDUPTHER

## 2020-06-08 ENCOUNTER — PATIENT OUTREACH (OUTPATIENT)
Dept: ADMINISTRATIVE | Facility: OTHER | Age: 80
End: 2020-06-08

## 2020-06-08 ENCOUNTER — OFFICE VISIT (OUTPATIENT)
Dept: FAMILY MEDICINE | Facility: CLINIC | Age: 80
End: 2020-06-08
Payer: MEDICARE

## 2020-06-08 VITALS
DIASTOLIC BLOOD PRESSURE: 76 MMHG | HEIGHT: 62 IN | OXYGEN SATURATION: 98 % | HEART RATE: 72 BPM | BODY MASS INDEX: 19.23 KG/M2 | WEIGHT: 104.5 LBS | TEMPERATURE: 99 F | SYSTOLIC BLOOD PRESSURE: 112 MMHG

## 2020-06-08 DIAGNOSIS — D75.839 THROMBOCYTOSIS: ICD-10-CM

## 2020-06-08 DIAGNOSIS — R10.13 EPIGASTRIC ABDOMINAL PAIN: Primary | ICD-10-CM

## 2020-06-08 PROCEDURE — 99999 PR PBB SHADOW E&M-EST. PATIENT-LVL III: CPT | Mod: PBBFAC,,, | Performed by: INTERNAL MEDICINE

## 2020-06-08 PROCEDURE — 99214 OFFICE O/P EST MOD 30 MIN: CPT | Mod: S$PBB,,, | Performed by: INTERNAL MEDICINE

## 2020-06-08 PROCEDURE — 99999 PR PBB SHADOW E&M-EST. PATIENT-LVL III: ICD-10-PCS | Mod: PBBFAC,,, | Performed by: INTERNAL MEDICINE

## 2020-06-08 PROCEDURE — 99214 PR OFFICE/OUTPT VISIT, EST, LEVL IV, 30-39 MIN: ICD-10-PCS | Mod: S$PBB,,, | Performed by: INTERNAL MEDICINE

## 2020-06-08 PROCEDURE — 99213 OFFICE O/P EST LOW 20 MIN: CPT | Mod: PBBFAC,PO | Performed by: INTERNAL MEDICINE

## 2020-06-08 NOTE — PROGRESS NOTES
"Subjective:       Patient ID: Sascha Martinez is a 80 y.o. female.    Chief Complaint: Hernia (left side )      HPI:      Known to me x1.  I saw her in March of 2020 for issues with wondering off.  There was concern for frontotemporal dementia.    She was seen on 06/02/2020 at ED for abdominal pain.     She has been having this for a few weeks. It is located in epigastric region. Food does make it worse. "afraid to eat". Burning in quality. She is also having LLQ pain at well. Had diverticulosis on CT. She is still resistant to procedure but states she will talk to GI. She is tearful and appears overwhelmed with any decisions. Likely secondary to FTD. She did have a fall to left side about 2 weeks ago. Abd pain could be ulcer and diverticular dz vs musculoskeletal. Pepcid may be helping. Difficult to know if this should be augmented to PPI.   -EGD  -continue meds  -ref GI  -hold off on PPI for now.   Current Outpatient Medications on File Prior to Visit   Medication Sig Dispense Refill    dicyclomine (BENTYL) 10 MG capsule TAKE 1 CAPSULE (10 MG TOTAL) BY MOUTH BEFORE MEALS AND AT BEDTIME AS NEEDED. 120 capsule 0    famotidine (PEPCID) 20 MG tablet Take 1 tablet (20 mg total) by mouth 2 (two) times daily. 60 tablet 11    lactulose (CHRONULAC) 10 gram/15 mL solution TAKE 15 MLS (10 G TOTAL) BY MOUTH ONCE DAILY.  0    sucralfate (CARAFATE) 1 gram tablet Take 1 tablet (1 g total) by mouth 4 (four) times daily before meals and nightly. dissolve in 10 mL of water and swallow for 7 days 28 tablet 0    rivastigmine (EXELON) 4.6 mg/24 hr PT24 Place 1 patch onto the skin once daily. (Patient not taking: Reported on 6/8/2020) 30 patch 11     No current facility-administered medications on file prior to visit.      I personally reviewed past medical, family and social history.  Review of Systems   Constitutional: Negative for activity change and fever.   HENT: Negative for sore throat and trouble swallowing.    Eyes: " Negative for pain and visual disturbance.   Respiratory: Negative for cough, shortness of breath and wheezing.    Cardiovascular: Negative for chest pain, palpitations and leg swelling.   Gastrointestinal: Positive for abdominal pain. Negative for blood in stool, diarrhea, nausea and vomiting.   Endocrine: Negative for cold intolerance and polyuria.   Genitourinary: Negative for decreased urine volume and dysuria.   Musculoskeletal: Negative for gait problem and neck pain.   Skin: Negative for rash.   Neurological: Negative for dizziness, syncope and light-headedness.   Psychiatric/Behavioral: Negative for dysphoric mood. The patient is not nervous/anxious.          Objective:     Vitals:    06/08/20 1430   BP: 112/76   Pulse: 72   Temp: 98.5 °F (36.9 °C)        Physical Exam   Constitutional: She is oriented to person, place, and time. She appears well-developed. No distress.   HENT:   Head: Normocephalic and atraumatic.   Eyes: Pupils are equal, round, and reactive to light. EOM are normal.   Neck: Neck supple. No thyromegaly present.   Cardiovascular: Normal rate, regular rhythm and normal heart sounds. Exam reveals no gallop and no friction rub.   No murmur heard.  Pulmonary/Chest: Effort normal and breath sounds normal. No respiratory distress. She has no wheezes.   Abdominal: Soft. Bowel sounds are normal. There is no tenderness.   Mild TTP in epigastric area. No abd bruising    Musculoskeletal: She exhibits no edema.   Neurological: She is alert and oriented to person, place, and time. No cranial nerve deficit.   Skin: Skin is warm. No rash noted.   Psychiatric: She has a normal mood and affect. Her behavior is normal.         Assessment/Plan   Sascha was seen today for hernia.    Diagnoses and all orders for this visit:    Epigastric abdominal pain  -     Ambulatory referral/consult to Gastroenterology; Future  -     Case request GI: ESOPHAGOGASTRODUODENOSCOPY (EGD)

## 2020-06-09 ENCOUNTER — TELEPHONE (OUTPATIENT)
Dept: GASTROENTEROLOGY | Facility: CLINIC | Age: 80
End: 2020-06-09

## 2020-06-10 ENCOUNTER — OFFICE VISIT (OUTPATIENT)
Dept: GASTROENTEROLOGY | Facility: CLINIC | Age: 80
End: 2020-06-10
Payer: MEDICARE

## 2020-06-10 VITALS — RESPIRATION RATE: 17 BRPM | BODY MASS INDEX: 19.23 KG/M2 | WEIGHT: 104.5 LBS | HEIGHT: 62 IN

## 2020-06-10 DIAGNOSIS — K76.89 LIVER CYST: ICD-10-CM

## 2020-06-10 DIAGNOSIS — K82.4 GALLBLADDER POLYP: ICD-10-CM

## 2020-06-10 DIAGNOSIS — Z80.0 FAMILY HISTORY OF COLON CANCER: ICD-10-CM

## 2020-06-10 DIAGNOSIS — Z87.898 HISTORY OF APHASIA: ICD-10-CM

## 2020-06-10 DIAGNOSIS — R10.13 EPIGASTRIC ABDOMINAL PAIN: Primary | ICD-10-CM

## 2020-06-10 PROCEDURE — 99999 PR PBB SHADOW E&M-EST. PATIENT-LVL IV: ICD-10-PCS | Mod: PBBFAC,,, | Performed by: NURSE PRACTITIONER

## 2020-06-10 PROCEDURE — 99999 PR PBB SHADOW E&M-EST. PATIENT-LVL IV: CPT | Mod: PBBFAC,,, | Performed by: NURSE PRACTITIONER

## 2020-06-10 PROCEDURE — 99214 PR OFFICE/OUTPT VISIT, EST, LEVL IV, 30-39 MIN: ICD-10-PCS | Mod: S$PBB,,, | Performed by: NURSE PRACTITIONER

## 2020-06-10 PROCEDURE — 99214 OFFICE O/P EST MOD 30 MIN: CPT | Mod: PBBFAC,PO | Performed by: NURSE PRACTITIONER

## 2020-06-10 PROCEDURE — 99214 OFFICE O/P EST MOD 30 MIN: CPT | Mod: S$PBB,,, | Performed by: NURSE PRACTITIONER

## 2020-06-10 RX ORDER — OMEPRAZOLE 40 MG/1
40 CAPSULE, DELAYED RELEASE ORAL DAILY
Qty: 30 CAPSULE | Refills: 2 | Status: ON HOLD | OUTPATIENT
Start: 2020-06-10 | End: 2020-11-23 | Stop reason: HOSPADM

## 2020-06-10 NOTE — PATIENT INSTRUCTIONS
Epigastric Pain (Uncertain Cause)     Epigastric pain can be a sign of disease in the upper abdomen. Common causes include:  · Acid reflux (stomach acid flowing up into the esophagus)  · Gastritis (irritation of the stomach lining)  · Peptic Ulcer Disease  · Inflammation of the pancreas  · Gallstone  · Infection in the gallbladder  Pain may be dull or burning. It may spread upward to the chest or to the back. There may be other symptoms such as belching, bloating, cramps or hunger pains. There may be weight loss or poor appetite, nausea or vomiting.  Since the diagnosis of your pain is not certain yet, further tests may sometimes be needed. Sometimes the doctor will treat you for the most likely condition to see if there is improvement before doing further tests.  Home care  Medicines  · Antacids help neutralize the normal acids in your stomach. Examples are Maalox, Mylanta, Rolaids, and Tums. If you dont like the liquid, you can also try a chewable one. You may find one works better than another for you. Overuse can cause diarrhea or constipation.  · Acid blockers (H2 blockers) decrease acid production. Examples are cimetidine (Tagamet), famotidine (Pepcid) and ranitidine (Zantac).  · Acid inhibitors (PPIs) decrease acid production in a different way than the blockers. You may find they work better, but can take a little longer to take effect.  Examples are omeprazole (Prilosec), lansoprazole (Prevacid), pantoprazole (Protonix), rabeprazole (Aciphex), and esomeprazole (Nexium).  · Take an antacid 30-60 minutes after eating and at bedtime, but not at the same time as an acid blocker.  · Try not to take NSAIDs. Aspirin may also cause problems, but if taking it for your heart or other medical reasons, talk to your doctor before stopping it; you do not want to cause a worse problem, like a heart attack or stroke.  Diet  · If certain foods seem to cause your spasm, try to avoid them.   · Eat slowly and chew food well  before swallowing. Symptoms of gastritis can be worsened by certain foods. Limit or avoid fatty, fried, and spicy foods, as well as coffee, chocolate, mint, and foods with high acid content such as tomatoes and citrus fruit and juices (orange, grapefruit, lemon).  · Avoid alcohol, caffeine, and tobacco, which can delay healing and worsen your problem.  · Try eating smaller meals with snacks in between  Follow-up care  Follow up with your healthcare provider or as advised.  When to seek medical advice  Call your healthcare provider right away if any of the following occur:  · Stomach pain worsens or moves to the right lower part of the abdomen  · Chest pain appears, or if it worsens or spreads to the chest, back, neck, shoulder, or arm  · Frequent vomiting (cant keep down liquids)  · Blood in the stool or vomit (red or black color)  · Feeling weak or dizzy, fainting, or having trouble breathing  · Fever of 100.4ºF (38ºC) or higher, or as directed by your healthcare provider  · Abdominal swelling  Date Last Reviewed: 9/25/2015  © 6772-0019 [employer]. [address] [place] [address]. All rights reserved. This information is not intended as a substitute for professional medical care. Always follow your healthcare professional's instructions.

## 2020-06-10 NOTE — PROGRESS NOTES
"Subjective:       Patient ID: Sascha Martinez is a 80 y.o. female, Body mass index is 19.11 kg/m².    Chief Complaint: Abdominal Pain      Patient is new to me. Established patient of Dr. Cerda and Alee Gastelum NP.    Here with daughter, whom assisted with interview. Patient is a poor historian. Daughter reports patient has difficulty with speech and has trouble finding her words.    Reviewed ED discharge summary from 6/2/2020: "Discussed findings of hiatal hernia as well as gallbladder polyp with .  Recommended Pepcid and Carafate for help with further pain control.  Lab and CT evaluation is otherwise unremarkable.  No evidence of cholecystitis.  Suspect the patient's pain may be coming from her hiatal hernia.  Patient has been had refused EGD in the past with GI.  I recommended that they reconsider further evaluation with GI.  Vital signs stable, will discharge home."     Abdominal Pain   This is a chronic problem. The current episode started more than 1 month ago (Started ~ middle of 2019). The onset quality is sudden. The problem occurs intermittently. The problem has been waxing and waning. The pain is located in the epigastric region, RUQ and LUQ. The quality of the pain is burning and cramping. The abdominal pain does not radiate. Associated symptoms include anorexia (reports she is scared to eat and has decreased appetite). Pertinent negatives include no belching, constipation (bowel movements are once per day), diarrhea, dysuria, fever, flatus, hematochezia, melena, nausea, vomiting or weight loss. The pain is aggravated by eating. The pain is relieved by nothing. She has tried H2 blockers (Past: unclear if Bentyl helped; Currently: Famotidine 20 mg BID and recently started on Carafate 1 gm QID) for the symptoms. Prior diagnostic workup includes CT scan and ultrasound. Her past medical history is significant for abdominal surgery (hx of hysterectomy) and GERD. There is no history of colon " "cancer, Crohn's disease, gallstones, irritable bowel syndrome, pancreatitis, PUD or ulcerative colitis.     Review of Systems   Constitutional: Positive for appetite change (decreased appetite). Negative for fever, unexpected weight change and weight loss.   HENT: Negative for trouble swallowing.    Respiratory: Negative for cough and shortness of breath.    Cardiovascular: Negative for chest pain.   Gastrointestinal: Positive for abdominal pain and anorexia (reports she is scared to eat and has decreased appetite). Negative for blood in stool, constipation (bowel movements are once per day), diarrhea, flatus, hematochezia, melena, nausea and vomiting.   Genitourinary: Negative for difficulty urinating and dysuria.   Musculoskeletal: Negative for gait problem.   Skin: Negative for rash.   Neurological: Negative for speech difficulty.   Psychiatric/Behavioral: Negative for confusion.       Past Medical History:   Diagnosis Date    Arthritis     right thumb,left elbow    Carpal tunnel syndrome, bilateral     Cataract     OS    DVT (deep venous thrombosis)     after hysterectomy    Endometrial thickening on ultra sound 2013    GERD (gastroesophageal reflux disease)     hiatal hernia    Hyperlipidemia     Memory loss     "hard time finding words"    Migraine headache     Osteoporosis     PVD (posterior vitreous detachment)     OU      Past Surgical History:   Procedure Laterality Date    CATARACT EXTRACTION W/  INTRAOCULAR LENS IMPLANT Right 01/30/2018    [facility]    CATARACT EXTRACTION W/  INTRAOCULAR LENS IMPLANT Left 03/13/2018    [facility]    CHALAZION EXCISION      [facility]    COLONOSCOPY  2009    COLONOSCOPY  05/02/2013    Dr. Cerda, in media: hemorrhoids, otherwise unremarkable; repeat in 5 years for screening    DILATION AND CURETTAGE OF UTERUS  5/2013    complex hyperplasia of the uterus    HYSTERECTOMY  5/28/2013    [facility] complex [facility]    LIPOMA RESECTION  2009    back    NASAL POLYP " SURGERY  1960's    OOPHORECTOMY        Family History   Problem Relation Age of Onset    Heart disease Mother     Diabetes Mother     Cancer Father         prostat    Colon cancer Brother 40    No Known Problems Sister     No Known Problems Maternal Aunt     No Known Problems Maternal Uncle     No Known Problems Paternal Aunt     No Known Problems Paternal Uncle     No Known Problems Maternal Grandmother     No Known Problems Maternal Grandfather     No Known Problems Paternal Grandmother     No Known Problems Paternal Grandfather     Breast cancer Neg Hx     Ovarian cancer Neg Hx     Amblyopia Neg Hx     Blindness Neg Hx     Cataracts Neg Hx     Glaucoma Neg Hx     Hypertension Neg Hx     Macular degeneration Neg Hx     Retinal detachment Neg Hx     Strabismus Neg Hx     Stroke Neg Hx     Thyroid disease Neg Hx     Crohn's disease Neg Hx     Ulcerative colitis Neg Hx     Celiac disease Neg Hx       Wt Readings from Last 10 Encounters:   06/10/20 47.4 kg (104 lb 8 oz)   06/08/20 47.4 kg (104 lb 8 oz)   06/02/20 47.1 kg (103 lb 13.4 oz)   03/18/20 48.4 kg (106 lb 11.2 oz)   02/05/20 49 kg (108 lb 2.2 oz)   12/27/19 49.3 kg (108 lb 11 oz)   12/24/19 50.1 kg (110 lb 7.2 oz)   12/02/19 50.1 kg (110 lb 7.2 oz)   10/11/19 49.3 kg (108 lb 11 oz)   06/06/19 51.6 kg (113 lb 12.1 oz)     Lab Results   Component Value Date    WBC 7.43 06/02/2020    HGB 13.4 06/02/2020    HCT 39.4 06/02/2020    MCV 90 06/02/2020     (H) 06/02/2020     CMP  Sodium   Date Value Ref Range Status   06/02/2020 139 136 - 145 mmol/L Final     Potassium   Date Value Ref Range Status   06/02/2020 3.0 (L) 3.5 - 5.1 mmol/L Final     Chloride   Date Value Ref Range Status   06/02/2020 106 95 - 110 mmol/L Final     CO2   Date Value Ref Range Status   06/02/2020 26 22 - 31 mmol/L Final     Glucose   Date Value Ref Range Status   06/02/2020 92 70 - 110 mg/dL Final     Comment:     The ADA recommends the following guidelines  for fasting glucose:  Normal:       less than 100 mg/dL  Prediabetes:  100 mg/dL to 125 mg/dL  Diabetes:     126 mg/dL or higher       BUN, Bld   Date Value Ref Range Status   06/02/2020 13 7 - 18 mg/dL Final     Creatinine   Date Value Ref Range Status   06/02/2020 0.62 0.50 - 1.40 mg/dL Final     Calcium   Date Value Ref Range Status   06/02/2020 9.4 8.4 - 10.2 mg/dL Final     Total Protein   Date Value Ref Range Status   06/02/2020 7.9 6.0 - 8.4 g/dL Final     Albumin   Date Value Ref Range Status   06/02/2020 4.6 3.5 - 5.2 g/dL Final     Total Bilirubin   Date Value Ref Range Status   06/02/2020 0.7 0.2 - 1.3 mg/dL Final     Alkaline Phosphatase   Date Value Ref Range Status   06/02/2020 88 38 - 145 U/L Final     AST   Date Value Ref Range Status   06/02/2020 23 14 - 36 U/L Final     ALT   Date Value Ref Range Status   06/02/2020 13 0 - 35 U/L Final     Anion Gap   Date Value Ref Range Status   06/02/2020 7 (L) 8 - 16 mmol/L Final     eGFR if    Date Value Ref Range Status   06/02/2020 >60 >60 mL/min/1.73 m^2 Final     eGFR if non    Date Value Ref Range Status   06/02/2020 >60 >60 mL/min/1.73 m^2 Final     Comment:     Calculation used to obtain the estimated glomerular filtration  rate (eGFR) is the CKD-EPI equation.          Lab Results   Component Value Date    LIPASERES 194 06/02/2020             Reviewed prior medical records including radiology report of US of abdomen 6/4/19 and CT of abdomen and pelvis 6/2/2020 & endoscopy history (see surgical history).     Objective:      Physical Exam   Constitutional: She is oriented to person, place, and time. Vital signs are normal. She appears well-developed and well-nourished. She is cooperative. She does not have a sickly appearance. No distress.   HENT:   Head: Normocephalic.   Right Ear: Hearing normal.   Left Ear: Hearing normal.   Nose: Nose normal.   Mouth/Throat: Uvula is midline, oropharynx is clear and moist and mucous  membranes are normal. No oral lesions.   Pt wearing mask due to COVID concerns   Eyes: Pupils are equal, round, and reactive to light. Conjunctivae and lids are normal.   Neck: Trachea normal and normal range of motion.   Cardiovascular: Normal rate, regular rhythm and normal heart sounds.   No murmur heard.  Pulmonary/Chest: Effort normal and breath sounds normal. No stridor. No respiratory distress. She has no wheezes.   Abdominal: Soft. Bowel sounds are normal. She exhibits no distension, no ascites and no mass. There is tenderness in the right upper quadrant and epigastric area. There is no rebound and no guarding.   Musculoskeletal: Normal range of motion.   Neurological: She is alert and oriented to person, place, and time.   Skin: Skin is warm, dry and intact. No rash noted.   Non jaundiced   Psychiatric: She has a normal mood and affect. Her behavior is normal. Her speech is delayed.         Assessment:       1. Epigastric abdominal pain    2. Family history of colon cancer    3. Gallbladder polyp    4. Liver cyst    5. History of aphasia           Plan:   All diagnoses and orders for this visit:    Epigastric abdominal pain  - Schedule EGD, discussed procedure with patient, including risks and benefits, patient verbalized understanding  - Start: omeprazole (PRILOSEC) 40 MG capsule; Take 1 capsule (40 mg total) by mouth once daily.  Dispense: 30 capsule; Refill: 2  - Continue Carafate 1 gm QID  - Take PPI in the morning 30 minutes before breakfast  - Recommend to avoid large meals, avoid eating within 3 hours of bedtime, elevate head of bed if nocturnal symptoms are present, smoking cessation (if current smoker), & weight loss (if overweight).   - Recommend minimize/avoid high-fat foods, chocolate, caffeine, citrus, alcohol, & tomato products.  - Advised to avoid/limit use of NSAID's, since they can cause GI upset, bleeding, and/or ulcers. If needed, take with food.     Family history of colon cancer   -  Discussed about Colonoscopy, including the risks and benefits of colonoscopy, patient verbalized understanding but patient declined colonoscopy.    Gallbladder polyp   - Recommend follow-up with general surgery for continued evaluation and management if EGD unremarkable     Liver cyst   - Recommend follow-up with PCP for continued evaluation and management    History of aphasia   - Recommend follow-up with PCP for continued evaluation and management    If no improvement in symptoms or symptoms worsen, call/follow-up at clinic or go to ER

## 2020-06-10 NOTE — LETTER
Zuri 10, 2020      Per Lorenzo,   [address] [facility] [address] [place] [address]           [facility] [facility]  [address] [place] [address]  Phone: [phone]          Patient: Sascha Martinez   MR Number: [id number]   YOB: 1940   Date of Visit: 6/10/2020       Dear Dr. Per Lorenzo:    Thank you for referring Sascha Martinez to me for evaluation. Attached you will find relevant portions of my assessment and plan of care.    If you have questions, please do not hesitate to call me. I look forward to following Sascha Martinez along with you.    Sincerely,    Anju Mcclain, NP    Enclosure  CC:  No Recipients    If you would like to receive this communication electronically, please contact [email or web address] or [phone] to request more information on [facility] access.    For providers and/or their staff who would like to refer a patient to [facility], please contact us through our one-stop-shop provider referral line, Babs Gibson, at [phone].    If you feel you have received this communication in error or would no longer like to receive these types of communications, please e-mail [email or web address]

## 2020-06-15 ENCOUNTER — PATIENT MESSAGE (OUTPATIENT)
Dept: NEUROLOGY | Facility: CLINIC | Age: 80
End: 2020-06-15

## 2020-06-16 ENCOUNTER — PATIENT MESSAGE (OUTPATIENT)
Dept: FAMILY MEDICINE | Facility: CLINIC | Age: 80
End: 2020-06-16

## 2020-06-16 DIAGNOSIS — F32.1 MODERATE MAJOR DEPRESSION: Primary | ICD-10-CM

## 2020-06-16 RX ORDER — CITALOPRAM 10 MG/1
10 TABLET ORAL DAILY
Qty: 30 TABLET | Refills: 11 | Status: SHIPPED | OUTPATIENT
Start: 2020-06-16 | End: 2020-07-22

## 2020-06-16 NOTE — TELEPHONE ENCOUNTER
Will start low-dose Celexa.  As already stated, it can take 4-6 weeks for this to start taking affect.

## 2020-06-17 ENCOUNTER — TELEPHONE (OUTPATIENT)
Dept: GASTROENTEROLOGY | Facility: CLINIC | Age: 80
End: 2020-06-17

## 2020-06-17 NOTE — TELEPHONE ENCOUNTER
----- Message from Yuly Ga sent at 6/17/2020 10:16 AM CDT -----  Type: Needs Medical Advice  Who Called:Patient spouse  Pharmacy name and phone #:  [phone] (home)   Best Call Back Number:[phone] (home)   Additional Information: the patient was not able to do the swab covid test and the spouse  wants to know what else can be done she has a procedure on Friday please advise

## 2020-06-17 NOTE — TELEPHONE ENCOUNTER
Spoke with pt's . Pt was unable to have covid test today.  asked if something else could be done in place of covid test. Informed  this test is required by [facility]. Procedure canceled. Pt will call back to reschedule when she thinks she can tolerate covid test.

## 2020-06-26 ENCOUNTER — OFFICE VISIT (OUTPATIENT)
Dept: GASTROENTEROLOGY | Facility: CLINIC | Age: 80
End: 2020-06-26
Payer: MEDICARE

## 2020-06-26 ENCOUNTER — PATIENT OUTREACH (OUTPATIENT)
Dept: ADMINISTRATIVE | Facility: OTHER | Age: 80
End: 2020-06-26

## 2020-06-26 VITALS — WEIGHT: 104 LBS | BODY MASS INDEX: 19.14 KG/M2 | HEIGHT: 62 IN

## 2020-06-26 DIAGNOSIS — K76.89 LIVER CYST: ICD-10-CM

## 2020-06-26 DIAGNOSIS — K44.9 HIATAL HERNIA: ICD-10-CM

## 2020-06-26 DIAGNOSIS — R10.13 EPIGASTRIC PAIN: Primary | ICD-10-CM

## 2020-06-26 DIAGNOSIS — K82.4 GALLBLADDER POLYP: ICD-10-CM

## 2020-06-26 PROCEDURE — 99213 OFFICE O/P EST LOW 20 MIN: CPT | Mod: PBBFAC,PO | Performed by: NURSE PRACTITIONER

## 2020-06-26 PROCEDURE — 99999 PR PBB SHADOW E&M-EST. PATIENT-LVL III: CPT | Mod: PBBFAC,,, | Performed by: NURSE PRACTITIONER

## 2020-06-26 PROCEDURE — 99214 PR OFFICE/OUTPT VISIT, EST, LEVL IV, 30-39 MIN: ICD-10-PCS | Mod: S$PBB,,, | Performed by: NURSE PRACTITIONER

## 2020-06-26 PROCEDURE — 99999 PR PBB SHADOW E&M-EST. PATIENT-LVL III: ICD-10-PCS | Mod: PBBFAC,,, | Performed by: NURSE PRACTITIONER

## 2020-06-26 PROCEDURE — 99214 OFFICE O/P EST MOD 30 MIN: CPT | Mod: S$PBB,,, | Performed by: NURSE PRACTITIONER

## 2020-06-26 RX ORDER — SUCRALFATE 1 G/1
1 TABLET ORAL 4 TIMES DAILY
Qty: 120 TABLET | Refills: 1 | Status: SHIPPED | OUTPATIENT
Start: 2020-06-26 | End: 2020-07-22

## 2020-06-26 NOTE — PROGRESS NOTES
Subjective:       Patient ID: Sascha Martinez is a 80 y.o. female, Body mass index is 19.02 kg/m².    Chief Complaint: Abdominal Pain and Diarrhea      Established patient of Alee Mead Nirav, NP and myself.    Here with , whom assisted with interview. Patient has dementia with aphasia and  is a poor historian.    Abdominal Pain  This is a chronic problem. The current episode started more than 1 month ago (Started middle of 2019). The onset quality is sudden. The problem occurs intermittently. The problem has been gradually worsening (especially over the past two months). The pain is located in the epigastric region, LUQ and RUQ. The quality of the pain is burning and cramping. The abdominal pain does not radiate. Associated symptoms include anorexia (reports eating makes pain worse and has decreased appetite). Pertinent negatives include no belching, constipation, diarrhea (reports three episodes of loose stool yesterday), dysuria, fever, flatus, hematochezia, melena, nausea, vomiting or weight loss. The pain is aggravated by eating. The pain is relieved by nothing. She has tried proton pump inhibitors and H2 blockers (Past: Famotidine ineffective; unclear if Bentyl or Carafate helped; Currently: Recently started on Omeprazole 40 mg once daily- has not helped) for the symptoms. Prior diagnostic workup includes CT scan and ultrasound (pt was scheduled for EGD but  reports when she went to have the COVID test done prior to procedure she refused to have it done). Her past medical history is significant for abdominal surgery (hx of hysterectomy) and GERD. There is no history of colon cancer, Crohn's disease, gallstones, irritable bowel syndrome, pancreatitis, PUD or ulcerative colitis.     Review of Systems   Constitutional: Positive for appetite change (decreased appetite). Negative for fever, unexpected weight change and weight loss.   HENT: Negative for trouble swallowing.   "  Respiratory: Negative for cough and shortness of breath.    Cardiovascular: Negative for chest pain.   Gastrointestinal: Positive for abdominal pain (Chief complaint) and anorexia (reports eating makes pain worse and has decreased appetite). Negative for blood in stool, constipation, diarrhea (reports three episodes of loose stool yesterday), flatus, hematochezia, melena, nausea and vomiting.   Genitourinary: Negative for difficulty urinating and dysuria.   Musculoskeletal: Negative for gait problem.   Skin: Negative for rash.   Neurological: Negative for speech difficulty.   Psychiatric/Behavioral: Positive for confusion.       Past Medical History:   Diagnosis Date    Arthritis     right thumb,left elbow    Carpal tunnel syndrome, bilateral     Cataract     OS    DVT (deep venous thrombosis)     after hysterectomy    Endometrial thickening on ultra sound 2013    GERD (gastroesophageal reflux disease)     hiatal hernia    Hyperlipidemia     Memory loss     "hard time finding words"    Migraine headache     Osteoporosis     PVD (posterior vitreous detachment)     OU      Past Surgical History:   Procedure Laterality Date    CATARACT EXTRACTION W/  INTRAOCULAR LENS IMPLANT Right 01/30/2018    [facility]    CATARACT EXTRACTION W/  INTRAOCULAR LENS IMPLANT Left 03/13/2018    [facility]    CHALAZION EXCISION      [facility]    COLONOSCOPY  2009    COLONOSCOPY  05/02/2013    Dr. Cerda, in media: hemorrhoids, otherwise unremarkable; repeat in 5 years for screening    DILATION AND CURETTAGE OF UTERUS  5/2013    complex hyperplasia of the uterus    HYSTERECTOMY  5/28/2013    [facility] for complex hyperplasia    LIPOMA RESECTION  2009    back    NASAL POLYP SURGERY  1960's    OOPHORECTOMY        Family History   Problem Relation Age of Onset    Heart disease Mother     Diabetes Mother     Cancer Father         prostat    Colon cancer Brother 40    No Known Problems Sister     No Known Problems Maternal Aunt  "    No Known Problems Maternal Uncle     No Known Problems Paternal Aunt     No Known Problems Paternal Uncle     No Known Problems Maternal Grandmother     No Known Problems Maternal Grandfather     No Known Problems Paternal Grandmother     No Known Problems Paternal Grandfather     Breast cancer Neg Hx     Ovarian cancer Neg Hx     Amblyopia Neg Hx     Blindness Neg Hx     Cataracts Neg Hx     Glaucoma Neg Hx     Hypertension Neg Hx     Macular degeneration Neg Hx     Retinal detachment Neg Hx     Strabismus Neg Hx     Stroke Neg Hx     Thyroid disease Neg Hx     Crohn's disease Neg Hx     Ulcerative colitis Neg Hx     Celiac disease Neg Hx       Wt Readings from Last 10 Encounters:   06/26/20 47.2 kg (104 lb)   06/10/20 47.4 kg (104 lb 8 oz)   06/08/20 47.4 kg (104 lb 8 oz)   06/02/20 47.1 kg (103 lb 13.4 oz)   03/18/20 48.4 kg (106 lb 11.2 oz)   02/05/20 49 kg (108 lb 2.2 oz)   12/27/19 49.3 kg (108 lb 11 oz)   12/24/19 50.1 kg (110 lb 7.2 oz)   12/02/19 50.1 kg (110 lb 7.2 oz)   10/11/19 49.3 kg (108 lb 11 oz)     Lab Results   Component Value Date    WBC 7.43 06/02/2020    HGB 13.4 06/02/2020    HCT 39.4 06/02/2020    MCV 90 06/02/2020     (H) 06/02/2020     CMP  Sodium   Date Value Ref Range Status   06/02/2020 139 136 - 145 mmol/L Final     Potassium   Date Value Ref Range Status   06/02/2020 3.0 (L) 3.5 - 5.1 mmol/L Final     Chloride   Date Value Ref Range Status   06/02/2020 106 95 - 110 mmol/L Final     CO2   Date Value Ref Range Status   06/02/2020 26 22 - 31 mmol/L Final     Glucose   Date Value Ref Range Status   06/02/2020 92 70 - 110 mg/dL Final     Comment:     The ADA recommends the following guidelines for fasting glucose:  Normal:       less than 100 mg/dL  Prediabetes:  100 mg/dL to 125 mg/dL  Diabetes:     126 mg/dL or higher       BUN, Bld   Date Value Ref Range Status   06/02/2020 13 7 - 18 mg/dL Final     Creatinine   Date Value Ref Range Status   06/02/2020  0.62 0.50 - 1.40 mg/dL Final     Calcium   Date Value Ref Range Status   06/02/2020 9.4 8.4 - 10.2 mg/dL Final     Total Protein   Date Value Ref Range Status   06/02/2020 7.9 6.0 - 8.4 g/dL Final     Albumin   Date Value Ref Range Status   06/02/2020 4.6 3.5 - 5.2 g/dL Final     Total Bilirubin   Date Value Ref Range Status   06/02/2020 0.7 0.2 - 1.3 mg/dL Final     Alkaline Phosphatase   Date Value Ref Range Status   06/02/2020 88 38 - 145 U/L Final     AST   Date Value Ref Range Status   06/02/2020 23 14 - 36 U/L Final     ALT   Date Value Ref Range Status   06/02/2020 13 0 - 35 U/L Final     Anion Gap   Date Value Ref Range Status   06/02/2020 7 (L) 8 - 16 mmol/L Final     eGFR if    Date Value Ref Range Status   06/02/2020 >60 >60 mL/min/1.73 m^2 Final     eGFR if non    Date Value Ref Range Status   06/02/2020 >60 >60 mL/min/1.73 m^2 Final     Comment:     Calculation used to obtain the estimated glomerular filtration  rate (eGFR) is the CKD-EPI equation.          Lab Results   Component Value Date    LIPASERES 194 06/02/2020             Reviewed prior medical records including radiology report of US of abdomen 6/4/19 and CT of abdomen and pelvis 6/2/2020 & endoscopy history (see surgical history).     Objective:      Physical Exam  Constitutional:       General: She is not in acute distress.     Appearance: She is well-developed.   HENT:      Head: Normocephalic.      Right Ear: Hearing normal.      Left Ear: Hearing normal.      Nose: Nose normal.      Mouth/Throat:      Comments: Pt wearing mask due to COVID concerns.  Eyes:      General: Lids are normal.      Conjunctiva/sclera: Conjunctivae normal.      Pupils: Pupils are equal, round, and reactive to light.   Neck:      Musculoskeletal: Normal range of motion.      Trachea: Trachea normal.   Cardiovascular:      Rate and Rhythm: Normal rate and regular rhythm.      Heart sounds: Normal heart sounds. No murmur.   Pulmonary:       Effort: Pulmonary effort is normal. No respiratory distress.      Breath sounds: Normal breath sounds. No stridor. No wheezing.   Abdominal:      General: Bowel sounds are normal. There is no distension.      Palpations: Abdomen is soft. There is no mass.      Tenderness: There is abdominal tenderness in the epigastric area. There is no guarding or rebound.   Musculoskeletal: Normal range of motion.   Skin:     General: Skin is warm and dry.      Findings: No rash.      Comments: Non jaundiced   Neurological:      Mental Status: She is alert and oriented to person, place, and time.   Psychiatric:         Mood and Affect: Mood is anxious.         Speech: Speech is delayed.         Behavior: Behavior normal. Behavior is cooperative.           Assessment:       1. Epigastric pain    2. Hiatal hernia    3. Gallbladder polyp    4. Liver cyst           Plan:   All diagnoses and orders for this visit:    Epigastric pain  - FL Upper GI Air Contrast; Future; Expected date: 06/26/2020 (since patient declines EGD)  - Start: sucralfate (CARAFATE) 1 gram tablet; Take 1 tablet (1 g total) by mouth 4 (four) times daily.  Dispense: 120 tablet; Refill: 1  - Continue Omeprazole 40 mg once daily  - Take PPI in the morning 30 minutes before breakfast  - Recommend to avoid large meals, avoid eating within 3 hours of bedtime, elevate head of bed if nocturnal symptoms are present, smoking cessation (if current smoker), & weight loss (if overweight).   - Recommend minimize/avoid high-fat foods, chocolate, caffeine, citrus, alcohol, & tomato products.  - Advised to avoid/limit use of NSAID's, since they can cause GI upset, bleeding, and/or ulcers. If needed, take with food.     Hiatal hernia   - Discussed diagnosis with patient & that it is usually managed by controlling reflux symptoms, surgery is an option, but usually performed if reflux is uncontrolled by medication management and lifestyle/dietary modifications; if symptoms  persist despite medication management and lifestyle/dietary modifications, we can refer to general surgery to consult about surgical options, patient verbalized understanding    Gallbladder polyp   - Recommend follow-up with general surgery for continued evaluation and management    Liver cyst   - Recommend follow-up with PCP for continued evaluation and management    If no improvement in symptoms or symptoms worsen, call/follow-up at clinic or go to ER

## 2020-06-26 NOTE — PATIENT INSTRUCTIONS
Epigastric Pain (Uncertain Cause)     Epigastric pain can be a sign of disease in the upper abdomen. Common causes include:  · Acid reflux (stomach acid flowing up into the esophagus)  · Gastritis (irritation of the stomach lining)  · Peptic Ulcer Disease  · Inflammation of the pancreas  · Gallstone  · Infection in the gallbladder  Pain may be dull or burning. It may spread upward to the chest or to the back. There may be other symptoms such as belching, bloating, cramps or hunger pains. There may be weight loss or poor appetite, nausea or vomiting.  Since the diagnosis of your pain is not certain yet, further tests may sometimes be needed. Sometimes the doctor will treat you for the most likely condition to see if there is improvement before doing further tests.  Home care  Medicines  · Antacids help neutralize the normal acids in your stomach. Examples are Maalox, Mylanta, Rolaids, and Tums. If you dont like the liquid, you can also try a chewable one. You may find one works better than another for you. Overuse can cause diarrhea or constipation.  · Acid blockers (H2 blockers) decrease acid production. Examples are cimetidine (Tagamet), famotidine (Pepcid) and ranitidine (Zantac).  · Acid inhibitors (PPIs) decrease acid production in a different way than the blockers. You may find they work better, but can take a little longer to take effect.  Examples are omeprazole (Prilosec), lansoprazole (Prevacid), pantoprazole (Protonix), rabeprazole (Aciphex), and esomeprazole (Nexium).  · Take an antacid 30-60 minutes after eating and at bedtime, but not at the same time as an acid blocker.  · Try not to take NSAIDs. Aspirin may also cause problems, but if taking it for your heart or other medical reasons, talk to your doctor before stopping it; you do not want to cause a worse problem, like a heart attack or stroke.  Diet  · If certain foods seem to cause your spasm, try to avoid them.   · Eat slowly and chew food well  before swallowing. Symptoms of gastritis can be worsened by certain foods. Limit or avoid fatty, fried, and spicy foods, as well as coffee, chocolate, mint, and foods with high acid content such as tomatoes and citrus fruit and juices (orange, grapefruit, lemon).  · Avoid alcohol, caffeine, and tobacco, which can delay healing and worsen your problem.  · Try eating smaller meals with snacks in between  Follow-up care  Follow up with your healthcare provider or as advised.  When to seek medical advice  Call your healthcare provider right away if any of the following occur:  · Stomach pain worsens or moves to the right lower part of the abdomen  · Chest pain appears, or if it worsens or spreads to the chest, back, neck, shoulder, or arm  · Frequent vomiting (cant keep down liquids)  · Blood in the stool or vomit (red or black color)  · Feeling weak or dizzy, fainting, or having trouble breathing  · Fever of 100.4ºF (38ºC) or higher, or as directed by your healthcare provider  · Abdominal swelling  Date Last Reviewed: 9/25/2015  © 0323-2983 [employer]. [address] [place] [address]. All rights reserved. This information is not intended as a substitute for professional medical care. Always follow your healthcare professional's instructions.

## 2020-06-29 ENCOUNTER — TELEPHONE (OUTPATIENT)
Dept: GASTROENTEROLOGY | Facility: CLINIC | Age: 80
End: 2020-06-29

## 2020-06-29 ENCOUNTER — PATIENT MESSAGE (OUTPATIENT)
Dept: GASTROENTEROLOGY | Facility: CLINIC | Age: 80
End: 2020-06-29

## 2020-06-29 DIAGNOSIS — R10.9 ABDOMINAL CRAMPING: ICD-10-CM

## 2020-06-29 DIAGNOSIS — R19.7 DIARRHEA, UNSPECIFIED TYPE: Primary | ICD-10-CM

## 2020-06-29 RX ORDER — DICYCLOMINE HYDROCHLORIDE 10 MG/1
10 CAPSULE ORAL 4 TIMES DAILY PRN
Qty: 120 CAPSULE | Refills: 0 | Status: SHIPPED | OUTPATIENT
Start: 2020-06-29

## 2020-06-29 NOTE — TELEPHONE ENCOUNTER
Since she is still having diarrhea, recommend stool studies to check for infection. Is he referring to the Carafate I just started her on or the Bentyl? They are both four times per day. I would like for her to try the Bentyl. We use it to treat abdominal pain and diarrhea. I sent a new Rx to her pharmacy in case she does not have anymore.

## 2020-06-30 NOTE — TELEPHONE ENCOUNTER
Ok. Stop the Carafate and start the Bentyl 10 mg QID that I sent to the pharmacy and complete stool studies

## 2020-07-01 ENCOUNTER — LAB VISIT (OUTPATIENT)
Dept: LAB | Facility: HOSPITAL | Age: 80
End: 2020-07-01
Attending: INTERNAL MEDICINE
Payer: MEDICARE

## 2020-07-01 DIAGNOSIS — G31.09 FRONTO-TEMPORAL DEMENTIA: Primary | ICD-10-CM

## 2020-07-01 DIAGNOSIS — R19.7 DIARRHEA, UNSPECIFIED TYPE: ICD-10-CM

## 2020-07-01 DIAGNOSIS — F02.80 FRONTO-TEMPORAL DEMENTIA: Primary | ICD-10-CM

## 2020-07-01 LAB — OB PNL STL: NEGATIVE

## 2020-07-01 PROCEDURE — 87045 FECES CULTURE AEROBIC BACT: CPT

## 2020-07-01 PROCEDURE — 87329 GIARDIA AG IA: CPT

## 2020-07-01 PROCEDURE — 87046 STOOL CULTR AEROBIC BACT EA: CPT

## 2020-07-01 PROCEDURE — 87324 CLOSTRIDIUM AG IA: CPT

## 2020-07-01 PROCEDURE — 87449 NOS EACH ORGANISM AG IA: CPT

## 2020-07-01 PROCEDURE — 82272 OCCULT BLD FECES 1-3 TESTS: CPT

## 2020-07-01 PROCEDURE — 87427 SHIGA-LIKE TOXIN AG IA: CPT

## 2020-07-02 LAB
C DIFF GDH STL QL: NEGATIVE
C DIFF TOX A+B STL QL IA: NEGATIVE
CRYPTOSP AG STL QL IA: NEGATIVE
E COLI SXT1 STL QL IA: NEGATIVE
E COLI SXT2 STL QL IA: NEGATIVE
G LAMBLIA AG STL QL IA: NEGATIVE

## 2020-07-04 LAB — BACTERIA STL CULT: NORMAL

## 2020-07-06 LAB — O+P STL MICRO: NORMAL

## 2020-07-07 ENCOUNTER — OUTPATIENT CASE MANAGEMENT (OUTPATIENT)
Dept: ADMINISTRATIVE | Facility: OTHER | Age: 80
End: 2020-07-07

## 2020-07-07 NOTE — PROGRESS NOTES
Outpatient Care Management   - Low Risk Patient Assessment    Patient: Sascha Martinez  MRN:  [id number]  Date of Service:  7/7/2020  Completed by:  Lavern Goldstein LMSW  Referral Date: 07/01/2020  Program: OPCM Low Risk    Reason for Visit   Patient presents with    Social Work Assessment - Low/Mod Risk       Patient Summary     OPCM Social Work Assessment (Low/Moderate Risk)    General  Level of Caregiver support: Caregiver currently provides assistance  Have you had to make a decision between paying for any of the following in the last 2 months?: None  Transportation means: Family  Employment status: Not employed  Do you have any of the following?: Medical power of   Current symptoms: Confusion, Delusions  Assessments  Was the PHQ Depression Screening completed this visit?: No  Was the MYRIAM-7 Screening completed this visit?: No      This LMSW received a referral on the above patient.   Reason for referral:Family is looking for assistance and guidance for possible memory care or in home assistance. Need evaluation to see what will add value to family. Patient is becoming more aggressive.  Name of the community resource that was provided:Alzheimer's 24/7 help line, Senior Resource Guide, List of Caregiver(PCA),   Resource given to: Spouse via Telephone and [place] Mail     This LMSW completed assessment with patient caregiver/spouse. Spouse reports he and patient resides together. Spouse reports he assist patient with ADL's. Spouse reports daughter involved in patient care but comes over to the home once a week. Spouse denied NH/Memory Care Unit is an option but is open to receiving resources for more in home support. LMSW provided caregiver with a list of private hire aides that can assist with sitter services etc. Spouse denied adult day care option. Spouse denied resources for caregiver support groups .Spouse reports he is busy and is unable to attend. LMSW provided spouse with information for  Alzheimer's  24/7 help line for additional support. Spouse reports patient is experiencing confusion and delusions but states she is not a danger to herself of others. LMSW educated spouse on if patient has  concerning behaviors or being gravely disabled or a harm to self or others. Family will need to call 911 or transport patient to the nearest  ER for a mental status evaluation. Spouse voiced understanding. Spouse reports he and daughter has MPOA on patient .  Patient doesent meet criteria for care ecosystem as she has not had a ED /IP stay within the last 30days. Caregiver aware of upcoming neurology appointment . Caregiver reports PCP  Provided medication (anti depressants) but was told meds can take up to 4- 6 weeks to take affect. Caregiver ask that this LMSW mail all resources.          Complex Care Plan     Care plan was discussed and completed today with input from patient and/or caregiver.    Goals    None         Patient Instructions     No follow-ups on file.    Todays OPCM Self-Management Care Plan was developed with the patients/caregivers input and was based on identified barriers from todays assessment.  Goals were written today with the patient/caregiver and the patient has agreed to work towards these goals to improve his/her overall well-being. Patient verbalized understanding of the care plan, goals, and all of today's instructions. Encouraged patient/caregiver to communicate with his/her physician and health care team about health conditions and the treatment plan.  Provided my contact information today and encouraged patient/caregiver to call me with any questions as needed.

## 2020-07-10 ENCOUNTER — PATIENT MESSAGE (OUTPATIENT)
Dept: FAMILY MEDICINE | Facility: CLINIC | Age: 80
End: 2020-07-10

## 2020-07-11 NOTE — TELEPHONE ENCOUNTER
----- Message from Tung Carbajal sent at 7/10/2020  2:38 PM CDT -----  Type: Needs Medical Advice  Who Called:  Daughter/Nena Alcantara     Best Call Back Number: [phone]  Additional Information: Caller states that she would like a callback regarding questions about the patient's

## 2020-07-13 ENCOUNTER — TELEPHONE (OUTPATIENT)
Dept: FAMILY MEDICINE | Facility: CLINIC | Age: 80
End: 2020-07-13

## 2020-07-13 ENCOUNTER — OUTPATIENT CASE MANAGEMENT (OUTPATIENT)
Dept: ADMINISTRATIVE | Facility: OTHER | Age: 80
End: 2020-07-13

## 2020-07-13 DIAGNOSIS — F02.80 FRONTO-TEMPORAL DEMENTIA: ICD-10-CM

## 2020-07-13 DIAGNOSIS — G31.09 FRONTO-TEMPORAL DEMENTIA: ICD-10-CM

## 2020-07-13 DIAGNOSIS — R41.3 MEMORY LOSS OR IMPAIRMENT: Primary | ICD-10-CM

## 2020-07-13 NOTE — TELEPHONE ENCOUNTER
----- Message from Lavern Goldstein LMSW sent at 7/13/2020  2:22 PM CDT -----  [place] [facility] [place] the above patient. Patient daughter requesting a consult to hospice . Daughter reports patient is declining. Daughter Nena reports patient is unable to complete ADL's nor is she eating . Daughter reports significant weight loss. Please follow up with patient daughter regarding a possible consult to hospice .  Family has been provided with community resources as well.         Thank you,     Lavern Goldstein LMSWSocial Worker

## 2020-07-13 NOTE — TELEPHONE ENCOUNTER
Spoke with Nena patient's daughter. She is researching hospice for her mom. Patient is declining. Her father can not take care of her because he is declining himself. She is requesting a referral to [facility]

## 2020-07-13 NOTE — PROGRESS NOTES
TYLERSW received a message from PACS representing stating patient daughter is requesting a return call from SW. SW followed Up with daughter. Daughter reports her father advised her this SW reached out to him on last week and was provided resources. Daughter reports resources have not been received. TYLERSW advised daughter resources information was sent via [place] mail and should be arriving any day. Daughter reports she needs further direction on what to do for patient. Daughter reports patient is in the process of seeing a new neurologist as family feels nothing was accomplished.  Daughter believes a consult to Hospice will be beneficial at this time. TYLERSW asked daughter did she have a preference on Hospice agency. Daughter unsure. TYLERSW provided daughter with a list of hospice agency and encouraged her to follow up with this SW once a decision has been made. TYLERSW explained to daughter patient spouse denied NH or Memory Care Unit would be an option. Daughter reports father needs additional assistance but is unable to provide it to patient. Daughter reports patient unable to complete ADLs. Daughter reports patient is refusing her medication and shes not eating. Daughter reports a hired aide is not beneficial currently.  Daughter believes patient may need 24 hr. support. Patient may need a memory care facility LMSW explained to daughter memory care units are private pay and can vary by price. Daughter ask if any memory care or NH will be covered by insurance. TYLERSW explained to daughter NH and memory care units are not covered by health insurance. The only facility that is covered is a SNF. Daughter can explore NH that have Medicaid beds available that has a wander guard but will need to speak with admissions regarding finances.  Daughter voiced understanding. YENNIFER provided daughter with infromation for Alzheimers 24/7 help line and a list of hospice agency via email [email or web address]      YENNIFER sent an in basket  message to PCP to follow up with daughter

## 2020-07-13 NOTE — TELEPHONE ENCOUNTER
Order for hospice signed.  Please let daughter know that I think she is making the right decision if her mother is continuing to decline.  Let us know she needs anything.

## 2020-07-13 NOTE — TELEPHONE ENCOUNTER
----- Message from Lavern Goldstein LMSW sent at 7/13/2020  2:22 PM CDT -----  This [facility] [place] on the above patient. Patient daughter requesting a consult to hospice . Daughter reports patient is declining. Daughter Nena reports patient is unable to complete ADL's nor is she eating . Daughter reports significant weight loss. Please follow up with patient daughter regarding a possible consult to hospice .  Family has been provided with community resources as well.         Thank you,     Lavern Goldstein LMSWSocial Worker

## 2020-07-20 ENCOUNTER — PATIENT OUTREACH (OUTPATIENT)
Dept: ADMINISTRATIVE | Facility: OTHER | Age: 80
End: 2020-07-20

## 2020-07-20 NOTE — PROGRESS NOTES
Requested updates within Care Everywhere.  Patient's chart was reviewed for overdue JHONNY topics.  Immunizations reconciled.

## 2020-07-22 ENCOUNTER — OFFICE VISIT (OUTPATIENT)
Dept: NEUROLOGY | Facility: CLINIC | Age: 80
End: 2020-07-22
Payer: MEDICARE

## 2020-07-22 VITALS
SYSTOLIC BLOOD PRESSURE: 151 MMHG | WEIGHT: 94.69 LBS | HEIGHT: 62 IN | BODY MASS INDEX: 17.42 KG/M2 | HEART RATE: 70 BPM | TEMPERATURE: 98 F | RESPIRATION RATE: 20 BRPM | DIASTOLIC BLOOD PRESSURE: 78 MMHG

## 2020-07-22 DIAGNOSIS — F03.90 DEMENTIA WITHOUT BEHAVIORAL DISTURBANCE, UNSPECIFIED DEMENTIA TYPE: Primary | ICD-10-CM

## 2020-07-22 PROCEDURE — 99214 PR OFFICE/OUTPT VISIT, EST, LEVL IV, 30-39 MIN: ICD-10-PCS | Mod: S$PBB,,, | Performed by: PSYCHIATRY & NEUROLOGY

## 2020-07-22 PROCEDURE — 99214 OFFICE O/P EST MOD 30 MIN: CPT | Mod: S$PBB,,, | Performed by: PSYCHIATRY & NEUROLOGY

## 2020-07-22 PROCEDURE — 99214 OFFICE O/P EST MOD 30 MIN: CPT | Mod: PBBFAC,PN | Performed by: PSYCHIATRY & NEUROLOGY

## 2020-07-22 PROCEDURE — 99999 PR PBB SHADOW E&M-EST. PATIENT-LVL IV: ICD-10-PCS | Mod: PBBFAC,,, | Performed by: PSYCHIATRY & NEUROLOGY

## 2020-07-22 PROCEDURE — 99999 PR PBB SHADOW E&M-EST. PATIENT-LVL IV: CPT | Mod: PBBFAC,,, | Performed by: PSYCHIATRY & NEUROLOGY

## 2020-07-22 RX ORDER — SERTRALINE HYDROCHLORIDE 25 MG/1
25 TABLET, FILM COATED ORAL DAILY
Qty: 30 TABLET | Refills: 11 | Status: SHIPPED | OUTPATIENT
Start: 2020-07-22 | End: 2020-08-11 | Stop reason: SDUPTHER

## 2020-07-22 NOTE — PROGRESS NOTES
Caregiver name:  Adam Martinez  Relationship to the patient:   Does the patient have a living will? yes  Does the patient have a designated healthcare POA? yes  Does the patient have a designated general POA? yes    Have educational materials/resources been provided? yes      Activities of Daily Living    Bathing: Dependent  Dressing: Dependent  Grooming: Dependent  Mouth Care: Needs Help  Toileting: Dependent  Transferring Bed/Chair: Needs Help  Walking: Independent  Climbing: Independent  Eating: Needs Help      Instrumental Activities of Daily Living    Shopping: Cannot Do  Cooking: Cannot Do  Managing Medications: Cannot Do  Using the phone and looking up numbers: Cannot Do  Doing Housework: Cannot Do  Doing Laundry: Cannot Do  Driving or using public transportation: Cannot Do  Managing finances: Cannot Do    Functional Assessment Staging  6c   Inability to handle mechanics of toileting (e.g., forgets to flush toilet, does not wipe properly or properly dispose of toilet tissue) occasionally or more frequently over the past weeks.*         Depression Patient Health Questionnaire 7/22/2020 10/11/2019 2/26/2018 3/29/2017 5/16/2016 3/5/2015   Over the last two weeks how often have you been bothered by little interest or pleasure in doing things 3 0 0 0 3 0   Over the last two weeks how often have you been bothered by feeling down, depressed or hopeless 3 0 0 0 0 0   PHQ-2 Total Score 6 0 0 0 3 0

## 2020-07-22 NOTE — PROGRESS NOTES
"    Date: 7/22/2020    Patient ID: Sascha Martinez is a 80 y.o. female.    Chief Complaint: Memory Loss (seen by Dr. Cuenca 2/5/2020) and Depression (anger and depression started not wanting people to care for her (daughter here with patient today)  Adam cares for patient normally.)      History of Present Illness:  Ms. Martinez is a 80 y.o. female who presents to hospitals care for frontotemporal dementia. She was previously followed by Dr. Cuenca for this. The patient was accompanied by her daughter who also contributed to the following history.     She began having memory trouble in about 2015. It was mostly short term memory trouble and difficulty with multi-step processes. She saw Dr. Cuenca in 2016 and in his note he documented no hallucinations or personality changes at that time. MMSE was 25/30. MRI was unremarkable. Labs were unremarkable for secondary causes. She did not get the neuropsych evaluation that was ordered and didn't followup from 2016 until Feb 2020. The family felt at that time that she was having executive dysfunction, getting lost in familiar places, personality changes, and depression. Her MMSE in Feb 2020 was 4/30 and her FAST score was 4. Her PHQ2 was 0.     Aricept was started in 2016 but she had GI side effects. She is now on rivastigmine instead started in Feb 2020. She has had anger and irritability. Her PCP started citalopram 10 mg daily.     She also endorses weight loss, change in appetite, and depression. Her PHQ2 score is now 6. She is sad and depressed feeling daily. Her daughter notes that she wakes up at 4am and cries every morning and says things like "I want to die". They haven't noted any improvement with the citalopram. The patient's  is the caregiver. The anger is directed at him. She doesn't want to get dressed. She doesn't want to bathe, etc. Her daughter has been going daily to make sure she takes the medication. There are more bad days and hours than good ones. " "    She falls asleep at 6pm on the sofa and sleeps there most of the night. She has to go to the restroom at night and he has to get up with her. She wakes up at 4am every morning. She has some gastrointestinal issues a few weeks ago. She is now having hallucinations and seeing things and people that aren't really there.     Review of Systems:   14 systems reviewed - All other systems were reviewed and negative except as mentioned in the HPI    Allergies:  Review of patient's allergies indicates:   Allergen Reactions    Ketorolac      Other reaction(s): Rash    Cefazolin Rash    Codeine Nausea And Vomiting     Other reaction(s): Nausea       Current Medications:  Current Outpatient Medications   Medication Sig Dispense Refill    dicyclomine (BENTYL) 10 MG capsule Take 1 capsule (10 mg total) by mouth 4 (four) times daily as needed. 120 capsule 0    rivastigmine (EXELON) 4.6 mg/24 hr PT24 Place 1 patch onto the skin once daily. 30 patch 11    lactulose (CHRONULAC) 10 gram/15 mL solution TAKE 15 MLS (10 G TOTAL) BY MOUTH ONCE DAILY.  0    omeprazole (PRILOSEC) 40 MG capsule Take 1 capsule (40 mg total) by mouth once daily. (Patient not taking: Reported on 7/22/2020) 30 capsule 2    sertraline (ZOLOFT) 25 MG tablet Take 1 tablet (25 mg total) by mouth once daily. 30 tablet 11     No current facility-administered medications for this visit.        Past Medical History:  Past Medical History:   Diagnosis Date    Arthritis     right thumb,left elbow    Carpal tunnel syndrome, bilateral     Cataract     OS    DVT (deep venous thrombosis)     after hysterectomy    Endometrial thickening on ultra sound 2013    GERD (gastroesophageal reflux disease)     hiatal hernia    Hyperlipidemia     Memory loss     "hard time finding words"    Migraine headache     Osteoporosis     PVD (posterior vitreous detachment)     OU       Past Surgical History:  Past Surgical History:   Procedure Laterality Date    CATARACT " "EXTRACTION W/  INTRAOCULAR LENS IMPLANT Right 01/30/2018    [facility]    CATARACT EXTRACTION W/  INTRAOCULAR LENS IMPLANT Left 03/13/2018    [facility]    CHALAZION EXCISION      [facility]    COLONOSCOPY  2009    COLONOSCOPY  05/02/2013    Dr. Cerda, in media: hemorrhoids, otherwise unremarkable; repeat in 5 years for screening    DILATION AND CURETTAGE OF UTERUS  5/2013    complex hyperplasia of the uterus    HYSTERECTOMY  5/28/2013    [facility] for complex hyperplasia    LIPOMA RESECTION  2009    back    NASAL POLYP SURGERY  1960's    OOPHORECTOMY         Family History:  family history includes Cancer in her father; Colon cancer (age of onset: 40) in her brother; Diabetes in her mother; Heart disease in her mother; No Known Problems in her maternal aunt, maternal grandfather, maternal grandmother, maternal uncle, paternal aunt, paternal grandfather, paternal grandmother, paternal uncle, and sister.    Social History:   reports that she has never smoked. She has never used smokeless tobacco. She reports previous alcohol use. She reports that she does not use drugs.    Physical Exam:  Vitals:    07/22/20 0814   BP: (!) 151/78   Pulse: 70   Resp: 20   Temp: 98.2 °F (36.8 °C)   Weight: 42.9 kg (94 lb 11 oz)   Height: 5' 2" (1.575 m)   PainSc: 0-No pain     Body mass index is 17.32 kg/m².  General: Well developed, well nourished.  No acute distress.  Musculoskeletal: No obvious joint deformities, moves all extremities well.  Peripheral vascular: No edema noted    Neurological Exam:  Mental status: Awake and alert  Speech language: No dysarthria. Very few words spoken.   Cranial nerves: Face symmetric  Motor: Moves all extremities well  Coordination: No ataxia. No tremor.       Data:  I have personally reviewed the referring provider's notes, labs, & imaging made available to me today.       Labs:  CBC:   Lab Results   Component Value Date    WBC 7.43 06/02/2020    HGB 13.4 06/02/2020    HCT 39.4 06/02/2020     " (H) 06/02/2020    MCV 90 06/02/2020    RDW 13.3 06/02/2020     BMP:   Lab Results   Component Value Date     06/02/2020    K 3.0 (L) 06/02/2020     06/02/2020    CO2 26 06/02/2020    BUN 13 06/02/2020    CREATININE 0.62 06/02/2020    GLU 92 06/02/2020    CALCIUM 9.4 06/02/2020     LFTS;   Lab Results   Component Value Date    PROT 7.9 06/02/2020    ALBUMIN 4.6 06/02/2020    BILITOT 0.7 06/02/2020    AST 23 06/02/2020    ALKPHOS 88 06/02/2020    ALT 13 06/02/2020     COAGS:   Lab Results   Component Value Date    INR 2.6 08/19/2013     FLP:   Lab Results   Component Value Date    CHOL 238 (H) 11/30/2016    HDL 54 11/30/2016    LDLCALC 165.2 (H) 11/30/2016    TRIG 94 11/30/2016    CHOLHDL 22.7 11/30/2016         Imaging:  I have personally reviewed the imaging, MRI brain with global atrophy age-appropriate.     Assessment and Plan:  Ms. Martinez is a 80 y.o. female who has dementia, possible frontotemporal, and depression/irritablity issues with this. Her PCP started citalopram 10 mg daily last month and this has not seemed to help. She has some GI side effects and the family thinks it would be difficult to get her in to get regular EKGs while titrating the dose. With the rivastigmine as well, we will choose zoloft instead. After 4 weeks, we could consider increasing dose. We will continue rivastigmine as well.     Dementia without behavioral disturbance, unspecified dementia type  -     Ambulatory referral/consult to Home Health; Future; Expected date: 07/29/2020    Other orders  -     sertraline (ZOLOFT) 25 MG tablet; Take 1 tablet (25 mg total) by mouth once daily.  Dispense: 30 tablet; Refill: 11

## 2020-08-10 ENCOUNTER — PATIENT MESSAGE (OUTPATIENT)
Dept: NEUROLOGY | Facility: CLINIC | Age: 80
End: 2020-08-10

## 2020-08-11 RX ORDER — SERTRALINE HYDROCHLORIDE 25 MG/1
50 TABLET, FILM COATED ORAL DAILY
Qty: 60 TABLET | Refills: 11 | Status: SHIPPED | OUTPATIENT
Start: 2020-08-11 | End: 2020-09-08

## 2020-08-19 ENCOUNTER — PATIENT MESSAGE (OUTPATIENT)
Dept: FAMILY MEDICINE | Facility: CLINIC | Age: 80
End: 2020-08-19

## 2020-08-24 ENCOUNTER — DOCUMENT SCAN (OUTPATIENT)
Dept: HOME HEALTH SERVICES | Facility: HOSPITAL | Age: 80
End: 2020-08-24
Payer: MEDICARE

## 2020-09-07 ENCOUNTER — PATIENT MESSAGE (OUTPATIENT)
Dept: NEUROLOGY | Facility: CLINIC | Age: 80
End: 2020-09-07

## 2020-09-08 ENCOUNTER — PATIENT MESSAGE (OUTPATIENT)
Dept: NEUROLOGY | Facility: CLINIC | Age: 80
End: 2020-09-08

## 2020-09-08 RX ORDER — QUETIAPINE FUMARATE 25 MG/1
25 TABLET, FILM COATED ORAL DAILY PRN
Qty: 30 TABLET | Refills: 11 | OUTPATIENT
Start: 2020-09-08 | End: 2021-09-08

## 2020-09-08 RX ORDER — SERTRALINE HYDROCHLORIDE 100 MG/1
100 TABLET, FILM COATED ORAL DAILY
Qty: 30 TABLET | Refills: 11 | Status: SHIPPED | OUTPATIENT
Start: 2020-09-08 | End: 2020-10-28

## 2020-09-08 NOTE — TELEPHONE ENCOUNTER
Patient still having issues with anger. Will try to maximize zoloft before we try a different medication. Will call in zoloft 100 mg daily. If this doesn't work, we can try a different antidepressant. We could also try seroquel if any violent behavior.

## 2020-10-05 ENCOUNTER — PATIENT OUTREACH (OUTPATIENT)
Dept: ADMINISTRATIVE | Facility: OTHER | Age: 80
End: 2020-10-05

## 2020-10-05 NOTE — PROGRESS NOTES
Health Maintenance Due   Topic Date Due    TETANUS VACCINE  01/13/1958    Shingles Vaccine (1 of 2) 01/13/1990    Pneumococcal Vaccine (65+ Low/Medium Risk) (2 of 2 - PPSV23) 12/01/2016    Influenza Vaccine (1) 08/01/2020     Updates were requested from care everywhere.  Chart was reviewed for overdue Proactive [facility] Encounters ([facility]) topics (CRS, Breast Cancer Screening, Eye exam)  Health Maintenance has been updated.  LINKS immunization registry triggered.  Immunizations were reconciled.

## 2020-10-07 ENCOUNTER — OFFICE VISIT (OUTPATIENT)
Dept: NEUROLOGY | Facility: CLINIC | Age: 80
End: 2020-10-07
Payer: MEDICARE

## 2020-10-07 VITALS
WEIGHT: 100.44 LBS | BODY MASS INDEX: 16.73 KG/M2 | SYSTOLIC BLOOD PRESSURE: 134 MMHG | HEART RATE: 72 BPM | HEIGHT: 65 IN | DIASTOLIC BLOOD PRESSURE: 75 MMHG | TEMPERATURE: 98 F | RESPIRATION RATE: 18 BRPM

## 2020-10-07 DIAGNOSIS — F03.90 DEMENTIA WITHOUT BEHAVIORAL DISTURBANCE, UNSPECIFIED DEMENTIA TYPE: Primary | ICD-10-CM

## 2020-10-07 PROCEDURE — 99214 PR OFFICE/OUTPT VISIT, EST, LEVL IV, 30-39 MIN: ICD-10-PCS | Mod: S$PBB,,, | Performed by: PSYCHIATRY & NEUROLOGY

## 2020-10-07 PROCEDURE — 99999 PR PBB SHADOW E&M-EST. PATIENT-LVL III: ICD-10-PCS | Mod: PBBFAC,,, | Performed by: PSYCHIATRY & NEUROLOGY

## 2020-10-07 PROCEDURE — 99214 OFFICE O/P EST MOD 30 MIN: CPT | Mod: S$PBB,,, | Performed by: PSYCHIATRY & NEUROLOGY

## 2020-10-07 PROCEDURE — 99999 PR PBB SHADOW E&M-EST. PATIENT-LVL III: CPT | Mod: PBBFAC,,, | Performed by: PSYCHIATRY & NEUROLOGY

## 2020-10-07 PROCEDURE — 99213 OFFICE O/P EST LOW 20 MIN: CPT | Mod: PBBFAC,PN | Performed by: PSYCHIATRY & NEUROLOGY

## 2020-10-07 RX ORDER — AMITRIPTYLINE HYDROCHLORIDE 10 MG/1
10 TABLET, FILM COATED ORAL DAILY
COMMUNITY
Start: 2020-09-19 | End: 2020-10-28

## 2020-10-07 NOTE — PROGRESS NOTES
Date: 10/7/2020    Patient ID: Sascha Martinez is a 80 y.o. female.    Chief Complaint: Medication Problem (Zoloft not working, anger not controlled)      History of Present Illness:  Ms. Martinez is a 80 y.o. female who presents for followup of frontotemporal dementia. The patient was accompanied by her daughter who also contributed to the following history.     Interval history: She has had increasing doses of zoloft and we also started seroquel 25 mg daily PRN. She is at [facility] now. The first few weeks in [place] were rough but she is better now. She is happy there. She is sleeping well.     Her daughter notes that she grinds her teeth and her dentist gave her amitriptyline 10 mg nightly. She is taking the seroquel 25 mg nightly and since then the anger is much better controlled.     She has had two episodes of significant head pain in the right temporal. This is short lived for just a few seconds.     Prior HPI from July 2020:  She began having memory trouble in about 2015. It was mostly short term memory trouble and difficulty with multi-step processes. She saw Dr. Cuenca in 2016 and in his note he documented no hallucinations or personality changes at that time. MMSE was 25/30. MRI was unremarkable. Labs were unremarkable for secondary causes. She did not get the neuropsych evaluation that was ordered and didn't followup from 2016 until Feb 2020. The family felt at that time that she was having executive dysfunction, getting lost in familiar places, personality changes, and depression. Her MMSE in Feb 2020 was 4/30 and her FAST score was 4. Her PHQ2 was 0.      Aricept was started in 2016 but she had GI side effects. She is now on rivastigmine instead started in Feb 2020. She has had anger and irritability. Her PCP started citalopram 10 mg daily.      She also endorses weight loss, change in appetite, and depression. Her PHQ2 score is now 6. She is sad and depressed feeling daily. Her daughter notes that she  "wakes up at 4am and cries every morning and says things like "I want to die". They haven't noted any improvement with the citalopram. The patient's  is the caregiver. The anger is directed at him. She doesn't want to get dressed. She doesn't want to bathe, etc. Her daughter has been going daily to make sure she takes the medication. There are more bad days and hours than good ones.      She falls asleep at 6pm on the sofa and sleeps there most of the night. She has to go to the restroom at night and he has to get up with her. She wakes up at 4am every morning. She has some gastrointestinal issues a few weeks ago. She is now having hallucinations and seeing things and people that aren't really there.     Review of Systems:   All other systems were reviewed and negative except as mentioned in the HPI    Allergies:  Review of patient's allergies indicates:   Allergen Reactions    Ketorolac      Other reaction(s): Rash    Cefazolin Rash    Codeine Nausea And Vomiting     Other reaction(s): Nausea       Current Medications:  Current Outpatient Medications   Medication Sig Dispense Refill    dicyclomine (BENTYL) 10 MG capsule Take 1 capsule (10 mg total) by mouth 4 (four) times daily as needed. 120 capsule 0    QUEtiapine (SEROQUEL) 25 MG Tab Take 1 tablet (25 mg total) by mouth daily as needed. 30 tablet 11    rivastigmine (EXELON) 4.6 mg/24 hr PT24 Place 1 patch onto the skin once daily. 30 patch 11    sertraline (ZOLOFT) 100 MG tablet Take 1 tablet (100 mg total) by mouth once daily. 30 tablet 11    amitriptyline (ELAVIL) 10 MG tablet Take 10 mg by mouth once daily.      lactulose (CHRONULAC) 10 gram/15 mL solution TAKE 15 MLS (10 G TOTAL) BY MOUTH ONCE DAILY.  0    omeprazole (PRILOSEC) 40 MG capsule Take 1 capsule (40 mg total) by mouth once daily. (Patient not taking: Reported on 7/22/2020) 30 capsule 2     No current facility-administered medications for this visit.        Past Medical " "History:  Past Medical History:   Diagnosis Date    Arthritis     right thumb,left elbow    Carpal tunnel syndrome, bilateral     Cataract     OS    DVT (deep venous thrombosis)     after hysterectomy    Endometrial thickening on ultra sound 2013    Fronto-temporal dementia     GERD (gastroesophageal reflux disease)     Hyperlipidemia     Memory loss     "hard time finding words"    Migraine headache     Osteoporosis     PVD (posterior vitreous detachment)     OU       Past Surgical History:  Past Surgical History:   Procedure Laterality Date    CATARACT EXTRACTION W/  INTRAOCULAR LENS IMPLANT Right 01/30/2018    [facility]    CATARACT EXTRACTION W/  INTRAOCULAR LENS IMPLANT Left 03/13/2018    [facility]    CHALAZION EXCISION      LLL    COLONOSCOPY  2009    COLONOSCOPY  05/02/2013    Dr. Cerda, in media: hemorrhoids, otherwise unremarkable; repeat in 5 years for screening    DILATION AND CURETTAGE OF UTERUS  5/2013    complex hyperplasia of the uterus    HYSTERECTOMY  5/28/2013    [facility] for complex hyperplasia    LIPOMA RESECTION  2009    back    NASAL POLYP SURGERY  1960's    OOPHORECTOMY         Family History:  family history includes Cancer in her father; Colon cancer (age of onset: 40) in her brother; Diabetes in her mother; Heart disease in her mother; No Known Problems in her maternal aunt, maternal grandfather, maternal grandmother, maternal uncle, paternal aunt, paternal grandfather, paternal grandmother, paternal uncle, and sister.    Social History:   reports that she has never smoked. She has never used smokeless tobacco. She reports previous alcohol use. She reports that she does not use drugs.    Physical Exam:  Vitals:    10/07/20 1522   BP: 134/75   Pulse: 72   Resp: 18   Temp: 97.6 °F (36.4 °C)   Weight: 45.5 kg (100 lb 6.7 oz)   Height: 5' 5" (1.651 m)   PainSc: 0-No pain     Body mass index is 16.71 kg/m².  General: Well developed, well nourished.  No acute " distress.  Musculoskeletal: No obvious joint deformities, moves all extremities well.  Peripheral vascular: No edema noted    Neurological Exam:  Mental status: Awake and alert  Speech language: No dysarthria. Expressive and receptive aphasia.   Cranial nerves: Face symmetric  Motor: Moves all extremities well  Coordination: No ataxia. No tremor.         Data:  I have personally reviewed other provider's notes, labs, & imaging made available to me today.       Labs:  CBC:   Lab Results   Component Value Date    WBC 8.42 09/07/2020    HGB 11.9 (L) 09/07/2020    HCT 35.3 (L) 09/07/2020     (H) 09/07/2020    MCV 92 09/07/2020    RDW 13.5 09/07/2020     BMP:   Lab Results   Component Value Date     09/07/2020    K 3.9 09/07/2020     09/07/2020    CO2 23 09/07/2020    BUN 17 09/07/2020    CREATININE 0.64 09/07/2020    GLU 88 09/07/2020    CALCIUM 9.3 09/07/2020     LFTS;   Lab Results   Component Value Date    PROT 7.4 09/07/2020    ALBUMIN 4.4 09/07/2020    BILITOT 0.9 09/07/2020    AST 31 09/07/2020    ALKPHOS 78 09/07/2020    ALT 19 09/07/2020     COAGS:   Lab Results   Component Value Date    INR 2.6 08/19/2013     FLP:   Lab Results   Component Value Date    CHOL 238 (H) 11/30/2016    HDL 54 11/30/2016    LDLCALC 165.2 (H) 11/30/2016    TRIG 94 11/30/2016    CHOLHDL 22.7 11/30/2016     EKG showed QTc 471 on 9/7/20    Imaging:  MRI brain with global atrophy age-appropriate.     Assessment and Plan:  Ms. Martinez is a 80 y.o. female here for followup of frontotemporal dementia. Her behavioral issues have improved with the seroquel. We will maintain her current medications (zoloft 100 mg daily and seroquel 25 mg nightly).     I will see her back in 6 months or sooner if needed.     Dementia without behavioral disturbance, unspecified dementia type

## 2020-10-27 ENCOUNTER — PATIENT MESSAGE (OUTPATIENT)
Dept: NEUROLOGY | Facility: CLINIC | Age: 80
End: 2020-10-27

## 2020-10-27 DIAGNOSIS — F03.90 DEMENTIA WITHOUT BEHAVIORAL DISTURBANCE, UNSPECIFIED DEMENTIA TYPE: Primary | ICD-10-CM

## 2020-10-27 DIAGNOSIS — F03.91 DEMENTIA WITH BEHAVIORAL DISTURBANCE, UNSPECIFIED DEMENTIA TYPE: ICD-10-CM

## 2020-10-28 ENCOUNTER — PATIENT MESSAGE (OUTPATIENT)
Dept: NEUROLOGY | Facility: CLINIC | Age: 80
End: 2020-10-28

## 2020-10-28 RX ORDER — DIVALPROEX SODIUM 125 MG/1
250 TABLET, DELAYED RELEASE ORAL EVERY 12 HOURS
Qty: 120 TABLET | Refills: 11 | Status: SHIPPED | OUTPATIENT
Start: 2020-10-28 | End: 2021-10-28

## 2020-10-28 RX ORDER — SERTRALINE HYDROCHLORIDE 100 MG/1
150 TABLET, FILM COATED ORAL DAILY
Qty: 45 TABLET | Refills: 11 | Status: SHIPPED | OUTPATIENT
Start: 2020-10-28 | End: 2021-10-28

## 2020-10-28 NOTE — TELEPHONE ENCOUNTER
I would not recommend amitritpyline as that can worsen confusion in dementia patients. I would not recommend xanax as that can also worsen confusion. I cannot give higher doses of seroquel given the prolonged QT interval on the EKG done in the ER. We can increase the zoloft to 150 mg daily and can add depakote 250 mg BID.     We need to get her into psychiatry for their expertise as well.

## 2020-11-13 ENCOUNTER — NURSE TRIAGE (OUTPATIENT)
Dept: ADMINISTRATIVE | Facility: CLINIC | Age: 80
End: 2020-11-13

## 2020-11-13 PROBLEM — F03.918 DEMENTIA WITH BEHAVIORAL DISTURBANCE: Status: ACTIVE | Noted: 2020-11-13

## 2020-11-13 PROBLEM — S32.592A PUBIC RAMUS FRACTURE, LEFT, CLOSED, INITIAL ENCOUNTER: Status: ACTIVE | Noted: 2020-11-13

## 2020-11-13 NOTE — TELEPHONE ENCOUNTER
Reason for Disposition   Severe headache    Additional Information   Negative: ACUTE NEUROLOGIC SYMPTOM and symptom present now   Negative: Knocked out (unconscious) > 1 minute   Negative: Seizure (convulsion) occurred (Exception: prior history of seizures and now alert and without Acute Neurologic Symptoms)   Negative: Neck pain after dangerous injury (e.g., MVA, diving, trampoline, contact sports, fall > 10 feet or 3 meters) (Exception: neck pain began > 1 hour after injury)   Negative: Major bleeding (actively dripping or spurting) that can't be stopped   Negative: Penetrating head injury (e.g., knife, gunshot wound, metal object)   Negative: Sounds like a life-threatening emergency to the triager   Negative: Recently examined and diagnosed with a concussion by a healthcare provider and has questions about concussion symptoms   Negative: Can't remember what happened (amnesia)   Negative: Vomiting once or more   Negative: Watery or blood-tinged fluid dripping from the nose or ears   Negative: ACUTE NEUROLOGIC SYMPTOM and now fine   Negative: Knocked out (unconscious) < 1 minute and now fine    Protocols used: HEAD INJURY-A-OH  spouse called to report pt fell today getting out of car. Knot on head. No bleeding. denies LOC. , also arm hurting. No N/V, no SOB. tripped an fell. c/o pain at site of knot. Rates pain 8. rec ED. Spouse agrees and states he can give her a ride. Call back with questions.

## 2020-11-18 PROBLEM — R53.1 GENERAL WEAKNESS: Status: ACTIVE | Noted: 2020-11-18

## 2020-11-18 PROBLEM — E43 SEVERE PROTEIN-CALORIE MALNUTRITION: Status: ACTIVE | Noted: 2020-11-18

## 2020-11-19 PROBLEM — Z71.89 ACP (ADVANCE CARE PLANNING): Status: ACTIVE | Noted: 2020-11-19

## 2020-11-23 PROBLEM — S72.002A CLOSED LEFT HIP FRACTURE: Status: ACTIVE | Noted: 2020-11-23

## 2020-11-23 PROBLEM — S72.009A HIP FX: Status: ACTIVE | Noted: 2020-11-23

## 2021-07-07 ENCOUNTER — PATIENT MESSAGE (OUTPATIENT)
Dept: ADMINISTRATIVE | Facility: HOSPITAL | Age: 81
End: 2021-07-07

## 2021-08-16 NOTE — PROGRESS NOTES
HPI     Post-op Evaluation    Additional comments: 1 month po s/p phaco IOL - OS, finished all gtts /   MFIOL - OU  //  pt happy w/ results           Comments   CE w/ MF IOL  OD 1/30/18  OS 3/13/18  Finished drops on 4/10/18  Some glare issues and wearing sunglasses  Report doing well w/ all vision, feels does not need any glasses         Last edited by MILO De La Cruz, OD on 4/13/2018 10:58 AM. (History)        ROS     Positive for: Eyes    Negative for: Constitutional, Gastrointestinal, Neurological, Skin,   Genitourinary, Musculoskeletal, HENT, Endocrine, Cardiovascular,   Respiratory, Psychiatric, Allergic/Imm, Heme/Lymph    Last edited by MILO De La Cruz, OD on 4/13/2018 10:53 AM. (History)        Assessment /Plan     For exam results, see Encounter Report.    S/P cataract extraction and insertion of intraocular lens, left      Good result OU w/ MF IOL  Post op gtts complete  No Rx specs needed for full time wear  Continue with sunglasses for increased sens during the day    Discussed and educated patient on current findings /plan.  RTC 1 year annual exam, prn if any changes / issues                    No

## 2022-05-31 ENCOUNTER — PATIENT MESSAGE (OUTPATIENT)
Dept: ADMINISTRATIVE | Facility: HOSPITAL | Age: 82
End: 2022-05-31
Payer: MEDICARE

## 2023-12-07 NOTE — PLAN OF CARE
CC:  Laura Morrison is here today for Physical.      Medications: medications verified, no change  Refills needed today?  NO  denies Latex allergy or sensitivity  Tobacco history: verified  Health Maintenance Due   Topic Date Due    Hepatitis B Vaccine (1 of 3 - 3-dose series) Never done    Pneumococcal Vaccine 0-64 (1 - PCV) Never done    Influenza Vaccine (1) 09/01/2023    COVID-19 Vaccine (4 - 2023-24 season) 09/01/2023    Depression Screening  12/07/2023     Patient is due for topics as listed above but is not proceeding with Immunization(s) COVID-19, Hep B, Influenza, and Pneumococcal at this time.     Patient would like communication of their results via:      Cell Phone:   Telephone Information:   Mobile [phone]     Okay to leave a message containing results? Yes  Review Flowsheet  More data exists         12/7/2023   PHQ 2/9 Score   Adult PHQ 2 Score 0   Adult PHQ 2 Interpretation No further screening needed   Little interest or pleasure in activity? Not at all   Feeling down, depressed or hopeless? Not at all       VSS, all questions answered. Denies recent fever or illness. Pt states ready for procedure.

## 2025-05-06 NOTE — INTERVAL H&P NOTE
Patient examined today.  No changes since last H&P written.

## (undated) DEVICE — SYR LUER LOCK 1CC

## (undated) DEVICE — SOL BSS BALANCED SALT

## (undated) DEVICE — PACK EYE CUSTOM COVINGTON.

## (undated) DEVICE — SEE L#120831

## (undated) DEVICE — PACK OPHTHALMIC

## (undated) DEVICE — SPONGE WEC CEL SPEARS

## (undated) DEVICE — SOL BETADINE 5%

## (undated) DEVICE — SOL IRR BSS OPHTH 500ML STRL

## (undated) DEVICE — SOL IRR STRL WATER 500ML

## (undated) DEVICE — CANNULA ANTERIOR CHAMBER 30G

## (undated) DEVICE — GARTER EYE ADULT

## (undated) DEVICE — SYR 3CC LUER LOC

## (undated) DEVICE — SEE MEDLINE ITEM 157128

## (undated) DEVICE — GLOVE BIOGEL ECLIPSE SZ 6.5